# Patient Record
Sex: MALE | Race: WHITE | Employment: FULL TIME | URBAN - METROPOLITAN AREA
[De-identification: names, ages, dates, MRNs, and addresses within clinical notes are randomized per-mention and may not be internally consistent; named-entity substitution may affect disease eponyms.]

---

## 2017-08-09 ENCOUNTER — APPOINTMENT (OUTPATIENT)
Dept: GENERAL RADIOLOGY | Age: 27
End: 2017-08-09
Attending: PHYSICIAN ASSISTANT
Payer: COMMERCIAL

## 2017-08-09 ENCOUNTER — HOSPITAL ENCOUNTER (EMERGENCY)
Age: 27
Discharge: HOME | End: 2017-08-10
Attending: EMERGENCY MEDICINE
Payer: COMMERCIAL

## 2017-08-09 DIAGNOSIS — S62.326A CLOSED DISPLACED FRACTURE OF SHAFT OF FIFTH METACARPAL BONE OF RIGHT HAND, INITIAL ENCOUNTER: Primary | ICD-10-CM

## 2017-08-09 PROCEDURE — 29125 APPL SHORT ARM SPLINT STATIC: CPT

## 2017-08-09 PROCEDURE — 74011250637 HC RX REV CODE- 250/637: Performed by: PHYSICIAN ASSISTANT

## 2017-08-09 PROCEDURE — 75810000053 HC SPLINT APPLICATION

## 2017-08-09 PROCEDURE — 73130 X-RAY EXAM OF HAND: CPT

## 2017-08-09 PROCEDURE — 99284 EMERGENCY DEPT VISIT MOD MDM: CPT

## 2017-08-09 RX ORDER — ACETAMINOPHEN 325 MG/1
650 TABLET ORAL
Status: COMPLETED | OUTPATIENT
Start: 2017-08-09 | End: 2017-08-10

## 2017-08-09 RX ORDER — NAPROXEN 250 MG/1
500 TABLET ORAL
Status: COMPLETED | OUTPATIENT
Start: 2017-08-09 | End: 2017-08-09

## 2017-08-09 RX ADMIN — NAPROXEN 500 MG: 250 TABLET ORAL at 22:50

## 2017-08-09 NOTE — LETTER
[address] 
[facility] EMERGENCY DEPT 
[address] [place] [address] [address] [id number] [phone] Work/School Note Date: 8/9/2017 To Whom It May concern: 
 
Antonio Ruiz was seen and treated today in the emergency room by the following provider(s): 
Attending Provider: Ran Gifford MD.   
 
Antonio Ruiz may return to work on 8/13/17, unable to use right hand until cleared by orthopedic surgery. Sincerely, Ran Serna.  Mary Gifford MD

## 2017-08-10 VITALS
HEIGHT: 71 IN | TEMPERATURE: 98.2 F | WEIGHT: 181.22 LBS | DIASTOLIC BLOOD PRESSURE: 94 MMHG | RESPIRATION RATE: 18 BRPM | SYSTOLIC BLOOD PRESSURE: 135 MMHG | OXYGEN SATURATION: 98 % | BODY MASS INDEX: 25.37 KG/M2 | HEART RATE: 101 BPM

## 2017-08-10 PROCEDURE — 74011250637 HC RX REV CODE- 250/637: Performed by: EMERGENCY MEDICINE

## 2017-08-10 RX ORDER — ACETAMINOPHEN 500 MG
1000 TABLET ORAL
Qty: 40 TAB | Refills: 0 | Status: SHIPPED | OUTPATIENT
Start: 2017-08-10 | End: 2017-11-01

## 2017-08-10 RX ORDER — MORPHINE SULFATE 15 MG/1
15-30 TABLET ORAL
Qty: 20 TAB | Refills: 0 | Status: SHIPPED | OUTPATIENT
Start: 2017-08-10 | End: 2017-11-01

## 2017-08-10 RX ADMIN — ACETAMINOPHEN 650 MG: 325 TABLET, FILM COATED ORAL at 00:01

## 2017-08-10 NOTE — ED PROVIDER NOTES
HPI Comments: Keerthi Davis is a 32 y.o. male presents ambulatory to the ED c/o an acute onset of right hand pain with associated swelling after punching a piece of thick glass last night, which did not break when he hit it. He expresses that upon waking up this morning the swelling was exacerbated and has been worsening throughout the day leading him to the ED. The pt notes that his pain is exacerbated with movement of the lateral fingers. He discloses that he is right hand dominant. The pt denies taking any medication PTA. He ensures that his Tetanus booster is UTD. He specifically denies any other complaints at this time. PCP: Lydia Isbell MD      There are no other complaints, changes or physical findings at this time. Written by BEBETO Hunteribdharmesh, as dictated by Nikki Cosby. Edwar Chaidez MD     The history is provided by the patient. No  was used. No past medical history on file. No past surgical history on file. No family history on file. Social History     Social History    Marital status: SINGLE     Spouse name: N/A    Number of children: N/A    Years of education: N/A     Occupational History    Not on file. Social History Main Topics    Smoking status: Not on file    Smokeless tobacco: Not on file    Alcohol use Not on file    Drug use: Not on file    Sexual activity: Not on file     Other Topics Concern    Not on file     Social History Narrative         ALLERGIES: Review of patient's allergies indicates no known allergies. Review of Systems   Constitutional: Negative for chills and fever. HENT: Negative for congestion. Eyes: Negative for visual disturbance. Respiratory: Negative for chest tightness. Cardiovascular: Negative for chest pain and leg swelling. Gastrointestinal: Negative for abdominal pain and vomiting. Endocrine: Negative for polyuria. Genitourinary: Negative for dysuria and frequency.    Musculoskeletal: Positive for arthralgias (right hand) and joint swelling (right hand). Negative for myalgias. Skin: Negative for color change. Allergic/Immunologic: Negative for immunocompromised state. Neurological: Negative for numbness. Patient Vitals for the past 12 hrs:   Temp Pulse Resp BP SpO2   08/10/17 0001 - (!) 101 18 (!) 135/94 98 %   08/09/17 2241 98.2 °F (36.8 °C) (!) 120 16 (!) 140/91 97 %        Physical Exam   Nursing note and vitals reviewed. General appearance: non-toxic, NAD  Eyes: PERRL, conjunctiva normal, anicteric sclera  HEENT:  oropharynx is clear  Pulmonary: good air exchange; scattered wheeze noted; no respiratory distress  Cardiac: normal rate and regular rhythm, no murmurs, gallops, or rubs, 2+ radial pulses  Abdomen: soft, nontender, nondistended, bowel sounds present  MSK: no pre-tibial edema, difficulty with but completes 5th digit opposition, dorsal soft tissue swelling of the right hand, TTP dorsal R hand over 4th/5th metacarpals  Neuro: Alert, answers questions appropriately  Skin: capillary refill brisk; healing granulation tissue over R 3rd metacarpal. No signs of laceration, abrasion only. MDM  Number of Diagnoses or Management Options  Diagnosis management comments: DDx: Strain, Sprain, Fracture. Amount and/or Complexity of Data Reviewed  Tests in the radiology section of CPT®: ordered and reviewed  Review and summarize past medical records: yes  Discuss the patient with other providers: yes (Orthopedics)    Patient Progress  Patient progress: stable    ED Course       Procedures    CONSULT NOTE:  12:20 AM  Nikki Cosby. Edwar Chaidez MD spoke with Ramon Maravilla PA-C,  Specialty: Orthopedics  Discussed pt's hx, disposition, and available diagnostic and imaging results. Reviewed care plans. Consultant recommends placing the pt in an ulnar gutter splint and having the pt follow up in office. Written by Alex Edmond ED Scribe, as dictated by Nikki Cosby.  Edwar Chaidez MD     Procedure Note - Splint Placement:  12:54 AM  Performed by: Anjelica Rdz   Neurovascularly intact prior to tx. An Orthoglass ulnar gutter splint was placed on pt's right hand. Joint was placed in extension with MCP's at 90 degrees. Neurovascularly intact after tx. The procedure took 1-15 minutes, and pt tolerated well. Written by Trevor Ahn ED Scribe, as dictated by [employer]. Mary Gifford MD.      IMAGING RESULTS:  XR HAND RT MIN 3 V   Final Result   EXAM:  XR HAND RT MIN 3 V     INDICATION:  injury. Right hand injury     COMPARISON: None.     FINDINGS: Three views of the right hand demonstrate an acute transverse fracture  of the distal shaft of the fifth metacarpal with apex posterior angulation. There is slight displacement of less than one cortical width. There is  overlying soft tissue swelling. There are no radiopaque foreign bodies  appreciated.     IMPRESSION  IMPRESSION:  Acute angulated fracture of the distal shaft of the fifth  metacarpal.       MEDICATIONS GIVEN:  Medications   naproxen (NAPROSYN) tablet 500 mg (500 mg Oral Given 8/9/17 2250)   acetaminophen (TYLENOL) tablet 650 mg (650 mg Oral Given 8/10/17 0001)       IMPRESSION:  1. Closed displaced fracture of shaft of fifth metacarpal bone of right hand, initial encounter        PLAN:  1. Current Discharge Medication List      START taking these medications    Details   acetaminophen (TYLENOL EXTRA STRENGTH) 500 mg tablet Take 2 Tabs by mouth every six (6) hours as needed for Pain. Indications: Pain  Qty: 40 Tab, Refills: 0      morphine IR (MS IR) 15 mg tablet Take 1-2 Tabs by mouth every six (6) hours as needed for Pain (Breakthrough pain not relieved by Tylenol or Naproxen). Max Daily Amount: 120 mg.  Indications: Severe Pain, causes drowsiness; do not drink,drive, or use machinery while taking  Qty: 20 Tab, Refills: 0         CONTINUE these medications which have NOT CHANGED    Details   lisdexamfetamine (VYVANSE) 20 mg capsule Take 20 mg by mouth two (2) times a day. 2.   Follow-up Information     Follow up With Details Comments [address] May MD Schedule an appointment as soon as possible for a visit in 1 week As needed [address] [place] [address] [facility]  [phone]      [facility] EMERGENCY DEPT Go in 1 day If symptoms worsen [address] [facility] [address], DO Schedule an appointment as soon as possible for a visit in 1 day [place] [id number] [phone]          3. Return to ED if worse   Discharge Note:  12:57 AM  The patient is ready for discharge. The patient's signs, symptoms, diagnosis, and discharge instruction have been discussed and the patient has conveyed their understanding. The patient is to follow up as recommended or return to the ER should their symptoms worsen. Plan has been discussed and the patient is in agreement. Written by Sterling Edmond ED Scribe, as dictated by J Carlos Jimenez. Nia Moreira MD     Attestation: This note is prepared by Yvonne Granda. Mayito, acting as Scribe for J Carlos Jimenez. Nia Moreira MD.      J Carlos Jimenez. Nia Moreira MD: The scribe's documentation has been prepared under my direction and personally reviewed by me in its entirety. I confirm that the note above accurately reflects all work, treatment, procedures, and medical decision making performed by me.

## 2017-08-10 NOTE — ED NOTES
Pt reports \"hit a piece of glass yesterday due to circumstances. I've used it a lot today it feels tight. \" Reports tetanus shot in last 5 years \"12/2016. \" Palpable pulses. <3sec cap refill. Redness & swelling noted to right hand, mostly on right last 3 knuckles. Able to perform some ROM but has a lot of pain which radiated to right elbow. Pt reported \"punch a piece of glass, did not shatter. \" Works as .

## 2017-08-10 NOTE — ED NOTES
Splinted by BAR Hampton. .Discharge instructions reviewed with patient and given to pt per MD Gonzalo Polk. Pt able to return/verbalize discharge instructions. Copy of discharge instructions given. RX given to pt. Pt condition stable, no further complaints. Pt out of ER, accompanied by self. Ambulatory, steady gait. Wheelchair offered & pt declined. Patient out of ED prior to obtaining discharge vitals. Belongings & discharge paperwork out of ED with patient.

## 2017-08-10 NOTE — DISCHARGE INSTRUCTIONS
Broken Hand: Care Instructions  Your Care Instructions  A hand can break (fracture) during sports, a fall, or other accidents. The break may happen when your hand twists, is hit, or is used to protect you in a fall. Fractures can range from a small, hairline crack, to a bone or bones broken into two or more pieces. Your treatment depends on how bad the break is. Your doctor may have put your hand in a brace, splint, or cast to allow it to heal or to keep it stable until you see another doctor. It may take weeks or months for your hand to heal. You can help it heal with some care at home. You heal best when you take good care of yourself. Eat a variety of healthy foods, and don't smoke. Follow-up care is a key part of your treatment and safety. Be sure to make and go to all appointments, and call your doctor if you are having problems. It's also a good idea to know your test results and keep a list of the medicines you take. How can you care for yourself at home? · Put ice or a cold pack on your hand for 10 to 20 minutes at a time. Try to do this every 1 to 2 hours for the next 3 days (when you are awake). Put a thin cloth between the ice and your cast or splint. Keep your cast or splint dry. · Follow the cast care instructions your doctor gives you. If you have a splint, do not take it off unless your doctor tells you to. · Be safe with medicines. Take pain medicines exactly as directed. ¨ If the doctor gave you a prescription medicine for pain, take it as prescribed. ¨ If you are not taking a prescription pain medicine, ask your doctor if you can take an over-the-counter medicine. · Prop up your hand on pillows when you sit or lie down in the first few days after the injury. Keep your hand higher than the level of your heart. This will help reduce swelling. · Follow instructions for exercises to keep your arm strong.   · Wiggle your uninjured fingers often to reduce swelling and stiffness, but do not use that hand to grasp or carry anything. When should you call for help? Call your doctor now or seek immediate medical care if:  · You have severe pain or increased pain. · Your cast or splint feels too tight. · You cannot move your fingers. · You have tingling, weakness, or numbness in your hand and fingers. · Your hand or fingers are cool or pale or change color. · You have a lot of swelling near your cast or splint. · The skin under your cast or splint is burning or stinging. Watch closely for changes in your health, and be sure to contact your doctor if:  · You do not get better as expected. Where can you learn more? Go to http://raffi-vivi.info/. Enter [phone] [phone] in the search box to learn more about \"Broken Hand: Care Instructions. \"  Current as of: March 21, 2017  Content Version: 11.3  © 4268-5109 [employer]. Care instructions adapted under license by [employer] (which disclaims liability or warranty for this information). If you have questions about a medical condition or this instruction, always ask your healthcare professional. [place] [address] any warranty or liability for your use of this information. Caring for Your Splint: After Your Visit  Your Care Instructions     A splint protects a broken bone or other injury. If you have a removable splint, follow your doctor's instructions and only remove the splint if your doctor says you can. Most splints can be adjusted. Your doctor will show you how to do this and will tell you when you might need to adjust the splint. Many splints are premade. Your doctor may also make a splint from plaster or fiberglass. Some splints have a built-in air cushion. Air pads are inflated to hold the injured area in place. Follow-up care is a key part of your treatment and safety. Be sure to make and go to all appointments, and call your doctor if you are having problems.  It's also a good idea to know your test results and keep a list of the medicines you take. How can you care for yourself at home? General care  · Don't put any weight on a splint. If you have a walking boot, your doctor will tell you when you can put weight on it. · If the fingers or toes on the limb with the splint were not injured, wiggle them every now and then. This helps move the blood and fluids in the injured limb. · Prop up the injured arm or leg on a pillow when you ice it or anytime you sit or lie down during the next 3 days. Try to keep it above the level of your heart. This will help reduce swelling. · Put ice or cold packs on the hurt area for 10 to 20 minutes at a time. Try to do this every 1 to 2 hours for the next 3 days (when you are awake) or until the swelling goes down. Be careful not to get the splint wet. · Be safe with medicines. Read and follow all instructions on the label. ¨ If the doctor gave you a prescription medicine for pain, take it as prescribed. ¨ If you are not taking a prescription pain medicine, ask your doctor if you can take an over-the-counter medicine. · Keep up your muscle strength and tone as much as you can while protecting your injured limb or joint. Your doctor may want you to tense and relax the muscles protected by the splint. Check with your doctor or physical therapist for instructions. Splint and skin care  · If your splint is not to be removed, try blowing cool air from a hair dryer or fan into the splint to help relieve itching. Never stick items under your splint to scratch the skin. · Do not use oils or lotions near your splint. If the skin becomes red or sore around the edge of the splint, you may pad the edges with a soft material, such as moleskin, or use tape to cover the edges. · If you're allowed to take your splint off, be sure your skin is dry before you put it back on. · Watch for pressure sores. These can develop over bony areas.  Symptoms include a warm spot under the cast, pain, drainage, or an odor. Call your doctor if you think you have a pressure sore. · Watch for compartment syndrome. This happens when pressure builds up in a group of muscles, nerves, and blood vessels. It is an emergency. Symptoms include severe pain or tingling or numbness. Water and your splint  · Keep your splint dry. Moisture can collect under the splint and cause skin irritation and itching. If you have a wound or have had surgery, moisture under the splint can increase the risk of infection. · Cover your splint with at least two layers of plastic when you take a shower or bath, unless your doctor said you can take it off while bathing. · If you can take the splint off when you bathe, pat the area dry after bathing and put the splint back on.  · If your splint gets a little wet, you can dry it with a hair dryer. Use a \"cool\" setting. When should you call for help? Call your doctor now or seek immediate medical care if:  · You have increased or severe pain. · You feel a warm or painful spot under the splint. · You have problems with your splint. For example:  ¨ The skin under the splint burns or stings. ¨ The splint feels too tight. ¨ There is a lot of swelling near the splint. (Some swelling is normal.)  ¨ You have a new fever. ¨ There is drainage or a bad smell coming from the splint. · Your foot or hand is cool or pale or changes color. · You have trouble moving your fingers or toes. · You have symptoms of a blood clot in your arm or leg (called a deep vein thrombosis). These may include:  ¨ Pain in the arm, calf, back of the knee, thigh, or groin. ¨ Redness and swelling in the arm, leg, or groin. Watch closely for changes in your health, and be sure to contact your doctor if:  · The splint is breaking apart or losing its shape. · You are not getting better as expected. Where can you learn more?    Go to [employer].be  Enter E456 in the search box to learn more about \"Caring for Your Splint: After Your Visit. \"   © 6048-6983 [employer]. Care instructions adapted under license by [facility] (which disclaims liability or warranty for this information). This care instruction is for use with your licensed healthcare professional. If you have questions about a medical condition or this instruction, always ask your healthcare professional. [place] [address] any warranty or liability for your use of this information.   Content Version: 33.3.283248; Current as of: June 4, 2014

## 2017-09-04 ENCOUNTER — HOSPITAL ENCOUNTER (EMERGENCY)
Age: 27
Discharge: HOME | End: 2017-09-04
Attending: EMERGENCY MEDICINE
Payer: COMMERCIAL

## 2017-09-04 ENCOUNTER — APPOINTMENT (OUTPATIENT)
Dept: CT IMAGING | Age: 27
End: 2017-09-04
Attending: EMERGENCY MEDICINE
Payer: COMMERCIAL

## 2017-09-04 VITALS
HEART RATE: 98 BPM | RESPIRATION RATE: 18 BRPM | SYSTOLIC BLOOD PRESSURE: 152 MMHG | BODY MASS INDEX: 25.2 KG/M2 | HEIGHT: 71 IN | WEIGHT: 180 LBS | OXYGEN SATURATION: 99 % | DIASTOLIC BLOOD PRESSURE: 96 MMHG

## 2017-09-04 DIAGNOSIS — F10.929 ALCOHOL INTOXICATION, WITH UNSPECIFIED COMPLICATION (HCC): ICD-10-CM

## 2017-09-04 DIAGNOSIS — T07.XXXA ABRASIONS OF MULTIPLE SITES: Primary | ICD-10-CM

## 2017-09-04 LAB
ALBUMIN SERPL-MCNC: 3.7 G/DL (ref 3.5–5)
ALBUMIN/GLOB SERPL: 1.2 {RATIO} (ref 1.1–2.2)
ALP SERPL-CCNC: 64 U/L (ref 45–117)
ALT SERPL-CCNC: 23 U/L (ref 12–78)
AMPHET UR QL SCN: POSITIVE
ANION GAP SERPL CALC-SCNC: 10 MMOL/L (ref 5–15)
APPEARANCE UR: CLEAR
AST SERPL-CCNC: 18 U/L (ref 15–37)
BACTERIA URNS QL MICRO: NEGATIVE /HPF
BARBITURATES UR QL SCN: NEGATIVE
BASOPHILS # BLD: 0.1 K/UL (ref 0–0.1)
BASOPHILS NFR BLD: 1 % (ref 0–1)
BENZODIAZ UR QL: NEGATIVE
BILIRUB SERPL-MCNC: 0.2 MG/DL (ref 0.2–1)
BILIRUB UR QL: NEGATIVE
BUN SERPL-MCNC: 8 MG/DL (ref 6–20)
BUN/CREAT SERPL: 8 (ref 12–20)
CALCIUM SERPL-MCNC: 7.7 MG/DL (ref 8.5–10.1)
CANNABINOIDS UR QL SCN: NEGATIVE
CHLORIDE SERPL-SCNC: 107 MMOL/L (ref 97–108)
CO2 SERPL-SCNC: 23 MMOL/L (ref 21–32)
COCAINE UR QL SCN: NEGATIVE
COLOR UR: ABNORMAL
CREAT BLD-MCNC: 1.2 MG/DL (ref 0.6–1.3)
CREAT SERPL-MCNC: 1.03 MG/DL (ref 0.7–1.3)
DRUG SCRN COMMENT,DRGCM: ABNORMAL
EOSINOPHIL # BLD: 0.8 K/UL (ref 0–0.4)
EOSINOPHIL NFR BLD: 7 % (ref 0–7)
EPITH CASTS URNS QL MICRO: ABNORMAL /LPF
ERYTHROCYTE [DISTWIDTH] IN BLOOD BY AUTOMATED COUNT: 13.5 % (ref 11.5–14.5)
ETHANOL SERPL-MCNC: 151 MG/DL
GLOBULIN SER CALC-MCNC: 3.2 G/DL (ref 2–4)
GLUCOSE SERPL-MCNC: 114 MG/DL (ref 65–100)
GLUCOSE UR STRIP.AUTO-MCNC: NEGATIVE MG/DL
HCT VFR BLD AUTO: 43 % (ref 36.6–50.3)
HGB BLD-MCNC: 15.3 G/DL (ref 12.1–17)
HGB UR QL STRIP: NEGATIVE
HYALINE CASTS URNS QL MICRO: ABNORMAL /LPF (ref 0–5)
KETONES UR QL STRIP.AUTO: NEGATIVE MG/DL
LACTATE SERPL-SCNC: 3.2 MMOL/L (ref 0.4–2)
LEUKOCYTE ESTERASE UR QL STRIP.AUTO: NEGATIVE
LYMPHOCYTES # BLD: 2.5 K/UL (ref 0.8–3.5)
LYMPHOCYTES NFR BLD: 22 % (ref 12–49)
MCH RBC QN AUTO: 30.5 PG (ref 26–34)
MCHC RBC AUTO-ENTMCNC: 35.6 G/DL (ref 30–36.5)
MCV RBC AUTO: 85.7 FL (ref 80–99)
METHADONE UR QL: NEGATIVE
MONOCYTES # BLD: 0.8 K/UL (ref 0–1)
MONOCYTES NFR BLD: 7 % (ref 5–13)
MUCOUS THREADS URNS QL MICRO: ABNORMAL /LPF
NEUTS SEG # BLD: 7.3 K/UL (ref 1.8–8)
NEUTS SEG NFR BLD: 63 % (ref 32–75)
NITRITE UR QL STRIP.AUTO: NEGATIVE
OPIATES UR QL: POSITIVE
PCP UR QL: NEGATIVE
PH UR STRIP: 6 [PH] (ref 5–8)
PLATELET # BLD AUTO: 264 K/UL (ref 150–400)
POTASSIUM SERPL-SCNC: 3.5 MMOL/L (ref 3.5–5.1)
PROT SERPL-MCNC: 6.9 G/DL (ref 6.4–8.2)
PROT UR STRIP-MCNC: 30 MG/DL
RBC # BLD AUTO: 5.02 M/UL (ref 4.1–5.7)
RBC #/AREA URNS HPF: ABNORMAL /HPF (ref 0–5)
SODIUM SERPL-SCNC: 140 MMOL/L (ref 136–145)
SP GR UR REFRACTOMETRY: 1.02 (ref 1–1.03)
SPERM URNS QL MICRO: PRESENT
TROPONIN I SERPL-MCNC: <0.04 NG/ML
UROBILINOGEN UR QL STRIP.AUTO: 0.2 EU/DL (ref 0.2–1)
WBC # BLD AUTO: 11.4 K/UL (ref 4.1–11.1)
WBC URNS QL MICRO: ABNORMAL /HPF (ref 0–4)

## 2017-09-04 PROCEDURE — 74011250636 HC RX REV CODE- 250/636: Performed by: EMERGENCY MEDICINE

## 2017-09-04 PROCEDURE — 96360 HYDRATION IV INFUSION INIT: CPT

## 2017-09-04 PROCEDURE — 80307 DRUG TEST PRSMV CHEM ANLYZR: CPT | Performed by: EMERGENCY MEDICINE

## 2017-09-04 PROCEDURE — 36415 COLL VENOUS BLD VENIPUNCTURE: CPT | Performed by: EMERGENCY MEDICINE

## 2017-09-04 PROCEDURE — 83605 ASSAY OF LACTIC ACID: CPT | Performed by: EMERGENCY MEDICINE

## 2017-09-04 PROCEDURE — 82565 ASSAY OF CREATININE: CPT

## 2017-09-04 PROCEDURE — 80053 COMPREHEN METABOLIC PANEL: CPT | Performed by: EMERGENCY MEDICINE

## 2017-09-04 PROCEDURE — 84484 ASSAY OF TROPONIN QUANT: CPT | Performed by: EMERGENCY MEDICINE

## 2017-09-04 PROCEDURE — 99285 EMERGENCY DEPT VISIT HI MDM: CPT

## 2017-09-04 PROCEDURE — 85025 COMPLETE CBC W/AUTO DIFF WBC: CPT | Performed by: EMERGENCY MEDICINE

## 2017-09-04 PROCEDURE — 70450 CT HEAD/BRAIN W/O DYE: CPT

## 2017-09-04 PROCEDURE — 70498 CT ANGIOGRAPHY NECK: CPT

## 2017-09-04 PROCEDURE — 81001 URINALYSIS AUTO W/SCOPE: CPT | Performed by: EMERGENCY MEDICINE

## 2017-09-04 PROCEDURE — 96361 HYDRATE IV INFUSION ADD-ON: CPT

## 2017-09-04 PROCEDURE — 74011636320 HC RX REV CODE- 636/320: Performed by: EMERGENCY MEDICINE

## 2017-09-04 RX ORDER — SODIUM CHLORIDE 0.9 % (FLUSH) 0.9 %
10 SYRINGE (ML) INJECTION
Status: COMPLETED | OUTPATIENT
Start: 2017-09-04 | End: 2017-09-04

## 2017-09-04 RX ORDER — SODIUM CHLORIDE 9 MG/ML
50 INJECTION, SOLUTION INTRAVENOUS
Status: COMPLETED | OUTPATIENT
Start: 2017-09-04 | End: 2017-09-04

## 2017-09-04 RX ADMIN — Medication 10 ML: at 07:38

## 2017-09-04 RX ADMIN — SODIUM CHLORIDE 1000 ML: 900 INJECTION, SOLUTION INTRAVENOUS at 07:20

## 2017-09-04 RX ADMIN — SODIUM CHLORIDE 50 ML/HR: 900 INJECTION, SOLUTION INTRAVENOUS at 07:38

## 2017-09-04 RX ADMIN — IOPAMIDOL 100 ML: 755 INJECTION, SOLUTION INTRAVENOUS at 07:38
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
## 2017-09-04 NOTE — DISCHARGE INSTRUCTIONS

## 2017-09-04 NOTE — ED NOTES
Bedside and Verbal shift change report given to Negra WOODS RN (oncoming nurse) by Mildred Ley RN (offgoing nurse). Report included the following information SBAR, Kardex, ED Summary and MAR.

## 2017-09-04 NOTE — ED NOTES
Bedside and Verbal shift change report given to Lydia Donovan RN and Farhana Rm RN (oncoming nurse). Report included the following information: SBAR, ED Summary, MAR and Recent Results.
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
## 2017-09-04 NOTE — ED NOTES
Dr. Wendy Lin reviewed discharge instructions with patient. Patient had no questions for physician. Patient was alert and oriented x3 and was in no physical distress and respirations were unlabored. Patient was discharged and escorted out of ER by [facility].

## 2017-11-01 ENCOUNTER — APPOINTMENT (OUTPATIENT)
Dept: GENERAL RADIOLOGY | Age: 27
End: 2017-11-01
Attending: NURSE PRACTITIONER

## 2017-11-01 ENCOUNTER — HOSPITAL ENCOUNTER (EMERGENCY)
Age: 27
Discharge: HOME | End: 2017-11-01
Attending: FAMILY MEDICINE

## 2017-11-01 VITALS
SYSTOLIC BLOOD PRESSURE: 140 MMHG | DIASTOLIC BLOOD PRESSURE: 99 MMHG | WEIGHT: 183 LBS | HEART RATE: 100 BPM | BODY MASS INDEX: 25.62 KG/M2 | RESPIRATION RATE: 18 BRPM | TEMPERATURE: 97.5 F | HEIGHT: 71 IN | OXYGEN SATURATION: 97 %

## 2017-11-01 DIAGNOSIS — R05.3 CHRONIC COUGH: ICD-10-CM

## 2017-11-01 DIAGNOSIS — F17.210 CIGARETTE SMOKER: Primary | ICD-10-CM

## 2017-11-01 RX ORDER — DOXYCYCLINE HYCLATE 100 MG
100 TABLET ORAL 2 TIMES DAILY
Qty: 14 TAB | Refills: 0 | Status: SHIPPED | OUTPATIENT
Start: 2017-11-01 | End: 2017-11-08

## 2017-11-01 RX ORDER — PREDNISONE 20 MG/1
TABLET ORAL
Qty: 10 TAB | Refills: 0 | Status: SHIPPED | OUTPATIENT
Start: 2017-11-01

## 2017-11-01 NOTE — DISCHARGE INSTRUCTIONS

## 2017-11-01 NOTE — UC PROVIDER NOTE
HPI Comments:     Here for 2 months of cough. Onset at first with cold like symptoms. Cough lasted for multiple weeks then went away. It seemed to return approx 3 weeks ago and has progressively gotten worse. He did have fever approx 1 week ago but thinks it is now gone. Cough worse in mornings and evenings and when he goes out in cold air. There is associated chest tightness and pain with deep breathing. Approx 1 week ago he started an old Rx of bactrim he had laying around the house this has not helped his cough. Denies asthma or underlying lung disease. Is a daily smoker trying to quit. Patient is a 32 y.o. male presenting with cough. Cough   Associated symptoms include wheezing. Pertinent negatives include no headaches, no shortness of breath, no nausea and no vomiting. Past Medical History:   Diagnosis Date    ADD (attention deficit disorder)         Past Surgical History:   Procedure Laterality Date    HX WISDOM TEETH EXTRACTION           Family History   Problem Relation Age of Onset    No Known Problems Mother     No Known Problems Father         Social History     Social History    Marital status: SINGLE     Spouse name: N/A    Number of children: N/A    Years of education: N/A     Occupational History    Not on file. Social History Main Topics    Smoking status: Current Every Day Smoker     Packs/day: 1.00    Smokeless tobacco: Never Used    Alcohol use Yes      Comment: Denies~quit \"last march, I don't drink but on occasion. , still an adult. \"     Drug use: No    Sexual activity: Yes     Birth control/ protection: None     Other Topics Concern    Not on file     Social History Narrative                ALLERGIES: Review of patient's allergies indicates no known allergies. Review of Systems   Constitutional: Positive for fever. Negative for unexpected weight change. Respiratory: Positive for cough and wheezing. Negative for shortness of breath.     Gastrointestinal: Negative for nausea and vomiting. Skin: Negative for rash. Neurological: Negative for headaches. Vitals:    11/01/17 1749   BP: (!) 140/99   Pulse: 100   Resp: 18   Temp: 97.5 °F (36.4 °C)   SpO2: 97%   Weight: 83 kg (183 lb)   Height: 5' 11\" (1.803 m)       Physical Exam   Constitutional: He is oriented to person, place, and time. Seems anxious. Fidgety. Moving around constantly on exam table. Does not seem toxic or ill. Not distressed. HENT:   Head: Normocephalic. Right Ear: External ear normal.   Left Ear: External ear normal.   Nose: Nose normal.   Mouth/Throat: Oropharynx is clear and moist. No oropharyngeal exudate. Eyes: EOM are normal. Pupils are equal, round, and reactive to light. Neck: Normal range of motion. Neck supple. No thyromegaly present. Cardiovascular: Normal rate, regular rhythm and normal heart sounds. Exam reveals no gallop and no friction rub. No murmur heard. Pulmonary/Chest: Effort normal and breath sounds normal. No respiratory distress. He has no wheezes. He has no rales. Lymphadenopathy:     He has no cervical adenopathy. Neurological: He is alert and oriented to person, place, and time. No cranial nerve deficit. Skin: Skin is warm and dry. Psychiatric: He has a normal mood and affect. Thought content normal.       MDM     Differential Diagnosis; Clinical Impression; Plan:       CLINICAL IMPRESSION:  (K20.500) Cigarette smoker  (primary encounter diagnosis)  (R05) Chronic cough    Orders Placed This Encounter      XR CHEST PA LAT      doxycycline (VIBRA-TABS) 100 mg tablet      predniSONE (DELTASONE) 20 mg tablet    CXR WNL. Will cover with doxy for possible chronic PND/bacterial process. Needs to follow up with PCP if symptoms do not improve over next 4-5 days as this required further work up. Plan:  1. Stop smoking. 2. Take above meds as Rx'd  3. See your PCP in 1 week.      Amount and/or Complexity of Data Reviewed:   Tests in the radiology section of CPT®:  Ordered and reviewed  Risk of Significant Complications, Morbidity, and/or Mortality:   Presenting problems: Moderate  Diagnostic procedures: Moderate  Management options:   Moderate  Progress:   Patient progress:  Stable      Procedures

## 2018-12-10 ENCOUNTER — APPOINTMENT (OUTPATIENT)
Dept: GENERAL RADIOLOGY | Age: 28
End: 2018-12-10
Payer: SELF-PAY

## 2018-12-10 ENCOUNTER — HOSPITAL ENCOUNTER (EMERGENCY)
Age: 28
Discharge: HOME | End: 2018-12-10
Attending: EMERGENCY MEDICINE
Payer: SELF-PAY

## 2018-12-10 VITALS
TEMPERATURE: 98.2 F | OXYGEN SATURATION: 100 % | RESPIRATION RATE: 18 BRPM | HEART RATE: 107 BPM | HEIGHT: 71 IN | BODY MASS INDEX: 26.45 KG/M2 | WEIGHT: 188.93 LBS | DIASTOLIC BLOOD PRESSURE: 77 MMHG | SYSTOLIC BLOOD PRESSURE: 121 MMHG

## 2018-12-10 DIAGNOSIS — J01.10 ACUTE FRONTAL SINUSITIS, RECURRENCE NOT SPECIFIED: Primary | ICD-10-CM

## 2018-12-10 PROCEDURE — 71046 X-RAY EXAM CHEST 2 VIEWS: CPT

## 2018-12-10 PROCEDURE — 99281 EMR DPT VST MAYX REQ PHY/QHP: CPT

## 2018-12-10 RX ORDER — FLUTICASONE PROPIONATE 50 MCG
2 SPRAY, SUSPENSION (ML) NASAL DAILY
Qty: 1 BOTTLE | Refills: 0 | Status: SHIPPED | OUTPATIENT
Start: 2018-12-10

## 2018-12-10 RX ORDER — HYDROCODONE POLISTIREX AND CHLORPHENIRAMINE POLISTIREX 10; 8 MG/5ML; MG/5ML
5 SUSPENSION, EXTENDED RELEASE ORAL
Qty: 100 ML | Refills: 0 | Status: SHIPPED | OUTPATIENT
Start: 2018-12-10

## 2018-12-10 RX ORDER — AMOXICILLIN AND CLAVULANATE POTASSIUM 875; 125 MG/1; MG/1
1 TABLET, FILM COATED ORAL 2 TIMES DAILY
Qty: 20 TAB | Refills: 0 | Status: SHIPPED | OUTPATIENT
Start: 2018-12-10

## 2018-12-10 NOTE — LETTER
[address] 
[facility] EMERGENCY DEPT 
[address] [place] [address] [phone] 
[phone] Work/School Note Date: 12/10/2018 To Whom It May concern: 
 
Jacqueline Ly was seen and treated today in the emergency room by the following provider(s): 
Attending Provider: Karla Mchugh MD 
Physician Assistant: BAR Portillo. Jacqueline Ly may return to work on [id number]. Sincerely, BAR Araujo

## 2018-12-11 NOTE — ED NOTES
Pt given discharge instructions by BAR Spears. Discharged ambulatory with steady gait. No acute distress at time of discharge.

## 2018-12-11 NOTE — ED PROVIDER NOTES
EMERGENCY DEPARTMENT HISTORY AND PHYSICAL EXAM 
 
 
Date: 12/10/2018 Patient Name: Adrienne Temple History of Presenting Illness Chief Complaint Patient presents with  Sore Throat  
  started 4-5 days ago with sore throat with cough and drainage making his throat hurt worse  Cough History Provided By: Patient HPI: Adrienne Temple, 29 y.o. male presents ambulatory to the ED with cc of 5 days of 7 out of 10 constant aching sinus pressure that is not better with hot tea and OTC cold medicine and is associated with post nasal drip that causes cough and sore throat. He tells me he has had a sinus infection in the past for which these symptoms are the same and for which improved with a course of antibiotics. He has felt feverish. No known sick contacts. Chief Complaint: sinus pressure Duration: 5 Days Timing:  Constant Location: sinuses Quality: Aching Severity: 7 out of 10 Modifying Factors: no better with OTC cold remedies Associated Symptoms: cough, sore throat, fever There are no other complaints, changes, or physical findings at this time. PCP: Ashok Pearce MD 
 
Current Outpatient Medications Medication Sig Dispense Refill  chlorpheniramine-HYDROcodone (TUSSIONEX PENNKINETIC ER) 10-8 mg/5 mL suspension Take 5 mL by mouth every twelve (12) hours as needed for Cough or Congestion. Max Daily Amount: 10 mL. 100 mL 0  
 fluticasone (FLONASE) 50 mcg/actuation nasal spray 2 Sprays by Both Nostrils route daily. 1 Bottle 0  
 amoxicillin-clavulanate (AUGMENTIN) 875-125 mg per tablet Take 1 Tab by mouth two (2) times a day. 20 Tab 0  predniSONE (DELTASONE) 20 mg tablet Take 2 tabs by mouth twice daily for 5 days. With food/Breakfast 10 Tab 0  
 lisdexamfetamine (VYVANSE) 20 mg capsule Take 20 mg by mouth two (2) times a day. Past History Past Medical History: 
Past Medical History:  
Diagnosis Date  ADD (attention deficit disorder) Past Surgical History: 
Past Surgical History:  
Procedure Laterality Date  HX WISDOM TEETH EXTRACTION Family History: 
Family History Problem Relation Age of Onset  No Known Problems Mother  No Known Problems Father Social History: 
Social History Tobacco Use  Smoking status: Current Every Day Smoker Packs/day: 1.00  Smokeless tobacco: Never Used Substance Use Topics  Alcohol use: Yes Comment: Denies~quit \"last march, I don't drink but on occasion. , still an adult. \"   
 Drug use: No  
 
 
Allergies: 
No Known Allergies Review of Systems Review of Systems Constitutional: Positive for fever. Negative for fatigue. HENT: Positive for congestion, sinus pressure, sinus pain and sore throat. Negative for ear pain and rhinorrhea. Eyes: Negative for pain and redness. Respiratory: Positive for cough. Negative for wheezing. Cardiovascular: Negative for chest pain and palpitations. Gastrointestinal: Negative for abdominal pain, nausea and vomiting. Genitourinary: Negative for dysuria, frequency and urgency. Musculoskeletal: Negative for back pain, neck pain and neck stiffness. Skin: Negative for rash and wound. Neurological: Negative for weakness, light-headedness, numbness and headaches. Physical Exam  
Physical Exam  
Constitutional: He is oriented to person, place, and time. He appears well-developed and well-nourished. Non-toxic appearance. No distress. HENT:  
Head: Normocephalic and atraumatic. Head is without right periorbital erythema and without left periorbital erythema. Right Ear: External ear normal.  
Left Ear: External ear normal.  
Nose: Right sinus exhibits frontal sinus tenderness. Left sinus exhibits frontal sinus tenderness. Mouth/Throat: Uvula is midline. No trismus in the jaw. Eyes: Conjunctivae and EOM are normal. Pupils are equal, round, and reactive to light. No scleral icterus. Neck: Normal range of motion and full passive range of motion without pain. Cardiovascular: Normal rate, regular rhythm and normal heart sounds. Pulmonary/Chest: Effort normal and breath sounds normal. No accessory muscle usage. No tachypnea. No respiratory distress. He has no decreased breath sounds. He has no wheezes. Abdominal: Soft. There is no tenderness. There is no rigidity and no guarding. Musculoskeletal: Normal range of motion. Neurological: He is alert and oriented to person, place, and time. He is not disoriented. No cranial nerve deficit or sensory deficit. GCS eye subscore is 4. GCS verbal subscore is 5. GCS motor subscore is 6. Skin: Skin is intact. No rash noted. Psychiatric: He has a normal mood and affect. His speech is normal.  
Nursing note and vitals reviewed. Diagnostic Study Results Labs - No results found for this or any previous visit (from the past 12 hour(s)). Radiologic Studies -  
XR CHEST PA LAT Final Result IMPRESSION: No acute process CT Results  (Last 48 hours) None CXR Results  (Last 48 hours) 12/10/18 1908  XR CHEST PA LAT Final result Impression:  IMPRESSION: No acute process Narrative:  INDICATION:  cough, congestion COMPARISON: 11/1/2017 FINDINGS: PA and lateral views of the chest demonstrate a stable  
cardiomediastinal silhouette and clear lungs bilaterally. The visualized osseous  
structures are unremarkable. Medical Decision Making I am the first provider for this patient. I reviewed the vital signs, available nursing notes, past medical history, past surgical history, family history and social history. Vital Signs-Reviewed the patient's vital signs. Patient Vitals for the past 12 hrs: 
 Temp Pulse Resp BP SpO2  
12/10/18 1728 98.2 °F (36.8 °C) (!) 107 18 121/77 100 % Pulse Oximetry Analysis - 100% on RA 
 
 Records Reviewed: Nursing Notes, Old Medical Records, Previous Radiology Studies, Previous Laboratory Studies and  Provider Notes (Medical Decision Making): DDx: pneumonia, bronchitis, sinusitis, URI 
 
ED Course:  
Initial assessment performed. The patients presenting problems have been discussed, and they are in agreement with the care plan formulated and outlined with them. I have encouraged them to ask questions as they arise throughout their visit. Disposition: 
Discharge PLAN: 
1. Current Discharge Medication List  
  
START taking these medications Details  
chlorpheniramine-HYDROcodone (TUSSIONEX PENNKINETIC ER) 10-8 mg/5 mL suspension Take 5 mL by mouth every twelve (12) hours as needed for Cough or Congestion. Max Daily Amount: 10 mL. Qty: 100 mL, Refills: 0 Associated Diagnoses: Acute frontal sinusitis, recurrence not specified  
  
fluticasone (FLONASE) 50 mcg/actuation nasal spray 2 Sprays by Both Nostrils route daily. Qty: 1 Bottle, Refills: 0  
  
amoxicillin-clavulanate (AUGMENTIN) 875-125 mg per tablet Take 1 Tab by mouth two (2) times a day. Qty: 20 Tab, Refills: 0 CONTINUE these medications which have NOT CHANGED Details  
predniSONE (DELTASONE) 20 mg tablet Take 2 tabs by mouth twice daily for 5 days. With food/Breakfast 
Qty: 10 Tab, Refills: 0  
  
  
 
2. Follow-up Information Follow up With Specialties Details Why Contact Info Derek Shelby MD Family Practice Schedule an appointment as soon as possible for a visit PRIMARY CARE: call to schedule follow up [email or web address] [facility]. [phone] Return to ED if worse Diagnosis Clinical Impression: 1. Acute frontal sinusitis, recurrence not specified

## 2020-01-06 ENCOUNTER — HOSPITAL ENCOUNTER (EMERGENCY)
Age: 30
Discharge: HOME | End: 2020-01-06
Attending: EMERGENCY MEDICINE
Payer: SELF-PAY

## 2020-01-06 ENCOUNTER — APPOINTMENT (OUTPATIENT)
Dept: GENERAL RADIOLOGY | Age: 30
End: 2020-01-06
Attending: NURSE PRACTITIONER
Payer: SELF-PAY

## 2020-01-06 VITALS
RESPIRATION RATE: 16 BRPM | OXYGEN SATURATION: 100 % | TEMPERATURE: 98.7 F | BODY MASS INDEX: 27.56 KG/M2 | WEIGHT: 196.87 LBS | HEART RATE: 101 BPM | HEIGHT: 71 IN | SYSTOLIC BLOOD PRESSURE: 134 MMHG | DIASTOLIC BLOOD PRESSURE: 78 MMHG

## 2020-01-06 DIAGNOSIS — J02.9 PHARYNGITIS, UNSPECIFIED ETIOLOGY: Primary | ICD-10-CM

## 2020-01-06 LAB — DEPRECATED S PYO AG THROAT QL EIA: NEGATIVE

## 2020-01-06 PROCEDURE — 87880 STREP A ASSAY W/OPTIC: CPT

## 2020-01-06 PROCEDURE — 99283 EMERGENCY DEPT VISIT LOW MDM: CPT

## 2020-01-06 PROCEDURE — 93005 ELECTROCARDIOGRAM TRACING: CPT

## 2020-01-06 PROCEDURE — 71046 X-RAY EXAM CHEST 2 VIEWS: CPT

## 2020-01-06 PROCEDURE — 87070 CULTURE OTHR SPECIMN AEROBIC: CPT

## 2020-01-06 PROCEDURE — 74011250636 HC RX REV CODE- 250/636: Performed by: NURSE PRACTITIONER

## 2020-01-06 PROCEDURE — 87147 CULTURE TYPE IMMUNOLOGIC: CPT

## 2020-01-06 RX ORDER — PROMETHAZINE HYDROCHLORIDE 6.25 MG/5ML
12.5 SYRUP ORAL
Qty: 280 ML | Refills: 0 | Status: SHIPPED | OUTPATIENT
Start: 2020-01-06 | End: 2020-01-13

## 2020-01-06 RX ORDER — AMOXICILLIN 875 MG/1
875 TABLET, FILM COATED ORAL 2 TIMES DAILY
Qty: 20 TAB | Refills: 0 | Status: SHIPPED | OUTPATIENT
Start: 2020-01-06 | End: 2020-01-06

## 2020-01-06 RX ORDER — AMOXICILLIN 875 MG/1
875 TABLET, FILM COATED ORAL 2 TIMES DAILY
Qty: 20 TAB | Refills: 0 | Status: SHIPPED | OUTPATIENT
Start: 2020-01-06 | End: 2020-01-16

## 2020-01-06 RX ADMIN — SODIUM CHLORIDE 1000 ML: 900 INJECTION, SOLUTION INTRAVENOUS at 22:29

## 2020-01-07 LAB
ATRIAL RATE: 117 BPM
CALCULATED P AXIS, ECG09: 34 DEGREES
CALCULATED R AXIS, ECG10: 31 DEGREES
CALCULATED T AXIS, ECG11: 21 DEGREES
DIAGNOSIS, 93000: NORMAL
P-R INTERVAL, ECG05: 120 MS
Q-T INTERVAL, ECG07: 308 MS
QRS DURATION, ECG06: 84 MS
QTC CALCULATION (BEZET), ECG08: 429 MS
VENTRICULAR RATE, ECG03: 117 BPM

## 2020-01-07 NOTE — DISCHARGE INSTRUCTIONS

## 2020-01-07 NOTE — ED PROVIDER NOTES
EMERGENCY DEPARTMENT HISTORY AND PHYSICAL EXAM      Date: 1/6/2020  Patient Name: Torito Viera    History of Presenting Illness     Chief Complaint   Patient presents with    Sore Throat     x several weeks, states that he started take some old amoxicillin that he had, and then it came back worse       History Provided By: Patient    HPI: oTrito Viera, 34 y.o. male presents by POV to the ED with cc of sore throat and cough. Patient states his throat started hurting approximately a week and a half ago and he had some old amoxicillin but the throat pain came back worse. Patient states he has been drinking a lot of warm coffee to help relieve throat symptoms. Patient states he has had at least 4 to 5 cups of coffee today. There are no other complaints, changes, or physical findings at this time. PCP: Pepe Rose MD    No current facility-administered medications on file prior to encounter. Current Outpatient Medications on File Prior to Encounter   Medication Sig Dispense Refill    chlorpheniramine-HYDROcodone (TUSSIONEX PENNKINETIC ER) 10-8 mg/5 mL suspension Take 5 mL by mouth every twelve (12) hours as needed for Cough or Congestion. Max Daily Amount: 10 mL. 100 mL 0    fluticasone (FLONASE) 50 mcg/actuation nasal spray 2 Sprays by Both Nostrils route daily. 1 Bottle 0    amoxicillin-clavulanate (AUGMENTIN) 875-125 mg per tablet Take 1 Tab by mouth two (2) times a day. 20 Tab 0    predniSONE (DELTASONE) 20 mg tablet Take 2 tabs by mouth twice daily for 5 days. With food/Breakfast 10 Tab 0    lisdexamfetamine (VYVANSE) 20 mg capsule Take 20 mg by mouth two (2) times a day.          Past History     Past Medical History:  Past Medical History:   Diagnosis Date    ADD (attention deficit disorder)        Past Surgical History:  Past Surgical History:   Procedure Laterality Date    HX WISDOM TEETH EXTRACTION         Family History:  Family History   Problem Relation Age of Onset    No Known Problems Mother     No Known Problems Father        Social History:  Social History     Tobacco Use    Smoking status: Current Every Day Smoker     Packs/day: 1.00    Smokeless tobacco: Never Used   Substance Use Topics    Alcohol use: Yes     Comment: Denies~quit \"last march, I don't drink but on occasion. , still an adult. \"     Drug use: No       Allergies:  No Known Allergies      Review of Systems   Review of Systems   Constitutional: Negative for activity change, chills and fever. HENT: Positive for sore throat. Negative for congestion and rhinorrhea. Respiratory: Positive for cough. Negative for shortness of breath. Cardiovascular: Negative for chest pain. Gastrointestinal: Negative for abdominal pain, constipation, diarrhea, nausea and vomiting. Endocrine: Negative for polyuria. Genitourinary: Negative for dysuria and frequency. Musculoskeletal: Negative for back pain. Neurological: Negative for dizziness and headaches. All other systems reviewed and are negative. Physical Exam   Physical Exam  Vitals signs and nursing note reviewed. Constitutional:       General: He is not in acute distress. Appearance: He is well-developed. He is not diaphoretic. Comments: 34 y.o. male   HENT:      Head: Normocephalic and atraumatic. Right Ear: Tympanic membrane and ear canal normal. No swelling or tenderness. Left Ear: Tympanic membrane and ear canal normal.      Nose: No congestion or rhinorrhea. Mouth/Throat:      Mouth: Mucous membranes are moist.      Pharynx: Pharyngeal swelling, oropharyngeal exudate and posterior oropharyngeal erythema present. Tonsils: No tonsillar exudate or tonsillar abscesses. Eyes:      Conjunctiva/sclera: Conjunctivae normal.      Pupils: Pupils are equal, round, and reactive to light. Neck:      Musculoskeletal: Normal range of motion. Cardiovascular:      Rate and Rhythm: Normal rate and regular rhythm.       Heart sounds: Normal heart sounds. No murmur. Pulmonary:      Effort: Pulmonary effort is normal. No respiratory distress. Breath sounds: Normal breath sounds. No stridor. No wheezing, rhonchi or rales. Chest:      Chest wall: No tenderness. Abdominal:      General: Bowel sounds are normal.      Palpations: Abdomen is soft. Skin:     General: Skin is warm and dry. Capillary Refill: Capillary refill takes less than 2 seconds. Neurological:      General: No focal deficit present. Mental Status: He is alert and oriented to person, place, and time. Psychiatric:         Mood and Affect: Mood normal.         Behavior: Behavior normal.         Diagnostic Study Results     Labs -     Recent Results (from the past 12 hour(s))   EKG, 12 LEAD, INITIAL    Collection Time: 01/06/20 10:21 PM   Result Value Ref Range    Ventricular Rate 117 BPM    Atrial Rate 117 BPM    P-R Interval 120 ms    QRS Duration 84 ms    Q-T Interval 308 ms    QTC Calculation (Bezet) 429 ms    Calculated P Axis 34 degrees    Calculated R Axis 31 degrees    Calculated T Axis 21 degrees    Diagnosis Sinus tachycardia  No previous ECGs available      STREP AG SCREEN, GROUP A    Collection Time: 01/06/20 10:30 PM   Result Value Ref Range    Group A Strep Ag ID NEGATIVE  NEG         Radiologic Studies -   XR CHEST PA LAT    (Results Pending)       Medical Decision Making   I am the first provider for this patient. I reviewed the vital signs, available nursing notes, past medical history, past surgical history, family history and social history. Vital Signs-Reviewed the patient's vital signs.   Patient Vitals for the past 12 hrs:   Temp Pulse Resp BP SpO2   01/06/20 2126 98.7 °F (37.1 °C) (!) 131 16 137/90 100 %       Records Reviewed: Nursing Notes, Old Medical Records, Previous Radiology Studies and Previous Laboratory Studies    Provider Notes (Medical Decision Making):   Differential diagnoses include viral pharyngitis, bacterial pharyngitis, postnasal drip, pneumonia. ED Course:   Initial assessment performed. The patients presenting problems have been discussed, and they are in agreement with the care plan formulated and outlined with them. I have encouraged them to ask questions as they arise throughout their visit. Discussed negative chest x-rays and negative rapid strep results. Advised him to begin taking the amoxicillin treatment and soon as possible until it is complete and follow-up with his primary care provider in 3 to 5 days. Advised patient to come back to the emergency department if symptoms worsen which include inability to swallow, difficulty breathing, fever unresolved with over-the-counter Tylenol or Motrin. Critical Care Time: None    Disposition:  DISCHARGE NOTE:  11:45 PM  The pt is ready for discharge. The pt's signs, symptoms, diagnosis, and discharge instructions have been discussed and pt has conveyed their understanding. The pt is to follow up as recommended or return to ER should their symptoms worsen. Plan has been discussed and pt is in agreement. Raina Haas NP  1/6/2020      PLAN:  1. Current Discharge Medication List      START taking these medications    Details   amoxicillin (AMOXIL) 875 mg tablet Take 1 Tab by mouth two (2) times a day for 10 days. Qty: 20 Tab, Refills: 0         CONTINUE these medications which have NOT CHANGED    Details   chlorpheniramine-HYDROcodone (TUSSIONEX PENNKINETIC ER) 10-8 mg/5 mL suspension Take 5 mL by mouth every twelve (12) hours as needed for Cough or Congestion. Max Daily Amount: 10 mL. Qty: 100 mL, Refills: 0    Associated Diagnoses: Acute frontal sinusitis, recurrence not specified      fluticasone (FLONASE) 50 mcg/actuation nasal spray 2 Sprays by Both Nostrils route daily. Qty: 1 Bottle, Refills: 0      amoxicillin-clavulanate (AUGMENTIN) 875-125 mg per tablet Take 1 Tab by mouth two (2) times a day.   Qty: 20 Tab, Refills: 0      predniSONE (DELTASONE) 20 mg tablet Take 2 tabs by mouth twice daily for 5 days. With food/Breakfast  Qty: 10 Tab, Refills: 0      lisdexamfetamine (VYVANSE) 20 mg capsule Take 20 mg by mouth two (2) times a day. 2.   Follow-up Information     Follow up With Specialties Details Why Contact Info    [facility] EMERGENCY DEPT Emergency Medicine Go in 1 week As needed, If symptoms worsen [address] [place] [address]    Tigist Burgess MD Family Practice Schedule an appointment as soon as possible for a visit in 1 week As needed, If symptoms worsen [address]  [phone]          Return to ED if worse     Diagnosis     Clinical Impression:   1. Pharyngitis, unspecified etiology          Please note that this dictation was completed with [employer], the computer voice recognition software. Quite often unanticipated grammatical, syntax, homophones, and other interpretive errors are inadvertently transcribed by the computer software. Please disregards these errors. Please excuse any errors that have escaped final proofreading. This note will not be viewable in [address]

## 2020-01-07 NOTE — ED NOTES
2300: Assumed care of patient from Jim Sheikh, [facility] at this time. 2350Jodharmesh Esparza NP has reviewed discharge instructions with the patient. The patient verbalized understanding. Patient ambulatory out of ED at this time.

## 2020-01-09 LAB
BACTERIA SPEC CULT: ABNORMAL
BACTERIA SPEC CULT: ABNORMAL
SERVICE CMNT-IMP: ABNORMAL

## 2020-11-10 ENCOUNTER — HOSPITAL ENCOUNTER (EMERGENCY)
Age: 30
Discharge: HOME | End: 2020-11-10
Attending: EMERGENCY MEDICINE

## 2020-11-10 ENCOUNTER — APPOINTMENT (OUTPATIENT)
Dept: GENERAL RADIOLOGY | Age: 30
End: 2020-11-10
Attending: EMERGENCY MEDICINE

## 2020-11-10 VITALS
SYSTOLIC BLOOD PRESSURE: 133 MMHG | DIASTOLIC BLOOD PRESSURE: 100 MMHG | OXYGEN SATURATION: 98 % | TEMPERATURE: 98 F | BODY MASS INDEX: 29.81 KG/M2 | HEART RATE: 102 BPM | RESPIRATION RATE: 18 BRPM | WEIGHT: 212.96 LBS | HEIGHT: 71 IN

## 2020-11-10 DIAGNOSIS — J20.8 ACUTE VIRAL BRONCHITIS: Primary | ICD-10-CM

## 2020-11-10 DIAGNOSIS — J06.9 ACUTE URI: ICD-10-CM

## 2020-11-10 PROCEDURE — 71046 X-RAY EXAM CHEST 2 VIEWS: CPT

## 2020-11-10 PROCEDURE — 99282 EMERGENCY DEPT VISIT SF MDM: CPT

## 2020-11-10 RX ORDER — PSEUDOEPHEDRINE HCL 120 MG/1
120 TABLET, FILM COATED, EXTENDED RELEASE ORAL
Qty: 5 TAB | Refills: 0 | Status: SHIPPED | OUTPATIENT
Start: 2020-11-10 | End: 2020-11-15

## 2020-11-10 RX ORDER — DOXYCYCLINE HYCLATE 100 MG
100 TABLET ORAL 2 TIMES DAILY
Qty: 14 TAB | Refills: 0 | Status: SHIPPED | OUTPATIENT
Start: 2020-11-10 | End: 2020-11-17

## 2020-11-10 RX ORDER — FLUTICASONE PROPIONATE 50 MCG
2 SPRAY, SUSPENSION (ML) NASAL DAILY
Qty: 1 BOTTLE | Refills: 0 | Status: SHIPPED | OUTPATIENT
Start: 2020-11-10

## 2020-11-10 RX ORDER — PREDNISONE 20 MG/1
20 TABLET ORAL DAILY
Qty: 5 TAB | Refills: 0 | Status: SHIPPED | OUTPATIENT
Start: 2020-11-10 | End: 2020-11-15

## 2020-11-10 NOTE — ED NOTES
Dr. Gulshan Hdz reviewed discharge instructions with the patient. The patient verbalized understanding. All questions and concerns were addressed. The patient declined a wheelchair and is discharged ambulatory in the care of family members with instructions and prescriptions in hand. Pt is alert and oriented x 4. Respirations are clear and unlabored.

## 2020-11-10 NOTE — DISCHARGE INSTRUCTIONS
Patient Education        Bronchitis: Care Instructions  Your Care Instructions     Bronchitis is inflammation of the bronchial tubes, which carry air to the lungs. The tubes swell and produce mucus, or phlegm. The mucus and inflamed bronchial tubes make you cough. You may have trouble breathing. Most cases of bronchitis are caused by viruses like those that cause colds. Antibiotics usually do not help and they may be harmful. Bronchitis usually develops rapidly and lasts about 2 to 3 weeks in otherwise healthy people. Follow-up care is a key part of your treatment and safety. Be sure to make and go to all appointments, and call your doctor if you are having problems. It's also a good idea to know your test results and keep a list of the medicines you take. How can you care for yourself at home? · Take all medicines exactly as prescribed. Call your doctor if you think you are having a problem with your medicine. · Get some extra rest.  · Take an over-the-counter pain medicine, such as acetaminophen (Tylenol), ibuprofen (Advil, Motrin), or naproxen (Aleve) to reduce fever and relieve body aches. Read and follow all instructions on the label. · Do not take two or more pain medicines at the same time unless the doctor told you to. Many pain medicines have acetaminophen, which is Tylenol. Too much acetaminophen (Tylenol) can be harmful. · Take an over-the-counter cough medicine that contains dextromethorphan to help quiet a dry, hacking cough so that you can sleep. Avoid cough medicines that have more than one active ingredient. Read and follow all instructions on the label. · Breathe moist air from a humidifier, hot shower, or sink filled with hot water. The heat and moisture will thin mucus so you can cough it out. · Do not smoke. Smoking can make bronchitis worse. If you need help quitting, talk to your doctor about stop-smoking programs and medicines.  These can increase your chances of quitting for good.  When should you call for help? Call 911 anytime you think you may need emergency care. For example, call if:    · You have severe trouble breathing. Call your doctor now or seek immediate medical care if:    · You have new or worse trouble breathing.     · You cough up dark brown or bloody mucus (sputum).     · You have a new or higher fever.     · You have a new rash. Watch closely for changes in your health, and be sure to contact your doctor if:    · You cough more deeply or more often, especially if you notice more mucus or a change in the color of your mucus.     · You are not getting better as expected. Where can you learn more? Go to http://www.gray.com/  Enter H333 in the search box to learn more about \"Bronchitis: Care Instructions. \"  Current as of: February 24, 2020               Content Version: 12.6  © 0724-5039 [employer]. Care instructions adapted under license by [employer] (which disclaims liability or warranty for this information). If you have questions about a medical condition or this instruction, always ask your healthcare professional. [address] any warranty or liability for your use of this information.

## 2020-11-24 NOTE — ED PROVIDER NOTES
EMERGENCY DEPARTMENT HISTORY AND PHYSICAL EXAM    Please note that this dictation was completed with [employer], the computer voice recognition software. Quite often unanticipated grammatical, syntax, homophones, and other interpretive errors are inadvertently transcribed by the computer software. Please disregard these errors. Please excuse any errors that have escaped final proofreading. Date: 11/10/2020  Patient Name: Blanca Rosas  Patient Age and Sex: 27 y.o. male    History of Presenting Illness     Chief Complaint   Patient presents with    Chest Congestion     Pt reports cough and chest congestion- chronic issue-\"anytime the weather changes. \"    Cough       History Provided By: Patient    HPI: Blanca Rosas, is a 27 y.o. male whose medical history is noted below and includes tobacco dependence, recurrent, seasonal URI and bronchitis, presents to the ED with congestion, post-nasal drip, productive cough with dark yellow sputum. He has a dry cogh at baseline, this worsened over the past week or so. No significant sob. No exertional cp. No fever, chills ,changes in appetite. No exposures to anyone diagnosed with covid 19 or anyone who has been ill. Bronchodilators have helped in the past as have abx at times. Pt denies any other alleviating or exacerbating factors. No other associated signs or symptoms. There are no other complaints, changes or physical findings at this time.      PCP: Ailyn Moss MD    Past History   All documented elements of the [address] reviewed and verified by me. -Vy Petersen MD    Past Medical History:  Past Medical History:   Diagnosis Date    ADD (attention deficit disorder)        Past Surgical History:  Past Surgical History:   Procedure Laterality Date    HX WISDOM TEETH EXTRACTION         Family History:  Family History   Problem Relation Age of Onset    No Known Problems Mother     No Known Problems Father        Social History:  Social History     Tobacco Use  Smoking status: Current Every Day Smoker     Packs/day: 1.00    Smokeless tobacco: Never Used   Substance Use Topics    Alcohol use: Yes     Comment: Denies~quit \"last march, I don't drink but on occasion. , still an adult. \"     Drug use: No       Allergies:  No Known Allergies    Review of Systems   All other systems reviewed and negative    Review of Systems   Constitutional: Negative for appetite change and fever. HENT: Positive for congestion, postnasal drip and rhinorrhea. Negative for ear pain, facial swelling, sinus pressure, sore throat and trouble swallowing. Eyes: Negative. Respiratory: Positive for cough. Negative for shortness of breath and wheezing. Cardiovascular: Negative for chest pain and palpitations. Gastrointestinal: Negative for abdominal pain, nausea and vomiting. Endocrine: Negative. Genitourinary: Negative for dysuria, flank pain and hematuria. Musculoskeletal: Negative for back pain and joint swelling. Skin: Negative. Negative for rash. Neurological: Negative for dizziness, weakness, light-headedness, numbness and headaches. Hematological: Negative for adenopathy. All other systems reviewed and are negative. Physical Exam   Reviewed patients vital signs and nursing note    Physical Exam  Vitals signs and nursing note reviewed. HENT:      Head: Atraumatic. Nose: Congestion present. Mouth/Throat:      Mouth: Mucous membranes are moist.   Eyes:      General: No scleral icterus. Extraocular Movements: Extraocular movements intact. Conjunctiva/sclera: Conjunctivae normal.      Pupils: Pupils are equal, round, and reactive to light. Neck:      Musculoskeletal: Normal range of motion and neck supple. Cardiovascular:      Rate and Rhythm: Normal rate and regular rhythm. Pulses: Normal pulses. Heart sounds: Normal heart sounds. Pulmonary:      Effort: Pulmonary effort is normal.      Breath sounds: Normal breath sounds. Abdominal:      Palpations: Abdomen is soft. Tenderness: There is no abdominal tenderness. Musculoskeletal: Normal range of motion. Skin:     General: Skin is warm and dry. Capillary Refill: Capillary refill takes less than 2 seconds. Neurological:      General: No focal deficit present. Mental Status: He is alert. Psychiatric:         Mood and Affect: Mood normal.         Behavior: Behavior normal.         Diagnostic Study Results     Labs - I have personally reviewed and interpreted all laboratory results. Chrissy Ayala MD, MSc  No results found for this or any previous visit (from the past 24 hour(s)). Radiologic Studies - I have personally reviewed and interpreted all imaging studies and agree with radiology interpretation and report. - Chrissy Ayala MD, MSc  XR CHEST PA LAT   Final Result   IMPRESSION: No acute intrathoracic disease. Medical Decision Making   I am the first provider for this patient. Records Reviewed: I reviewed our electronic medical record system for any past medical records that were available that may contribute to the patient's current condition, including their PMH, surgical history, social and family history. Reviewed the nursing notes and vital signs from today's visit. Nursing notes will be reviewed as they become available in realtime while the pt has been in the ED. In addition, I read most recent discharge summaries, if available and reviewed prior ECGs or imaging studies for comparison purposes. Chrissy Ayala MD Msc    Vital Signs-Reviewed the patient's vital signs. Normal o2 sat on ra    Provider Notes (Medical Decision Making): The patient complains of nasal congestion, post nasal drip, wet productive cough without dyspnea or wheezing. Symptoms are consistent with an uncomplicated viral URI followed by symptoms of acute bronchitis. Several OTC medications suggested for symptomatic therapy.  Increase fluids, use vaporizer, stay in steamy bathroom tid 15 min prn severe cough, Tylenol/Motrin if tolerated as needed, rest, avoid smoky areas. Given history of smoking, change in sputum and length of symptoms, will give course of abx. CXR shows no evidence of pneumonia or edema. Strict return precautions reviewed including follow up prn if not better in 72 hours. ED Course:   Initial assessment performed. The patients presenting problems have been discussed, and they are in agreement with the care plan formulated and outlined with them. I have encouraged them to ask questions as they arise throughout their visit. TOBACCO COUNSELING:   Upon evaluation, pt expressed that they are a current tobacco user. For approximately 10 minutes, I counseled pt face-to-face on the dangers of smoking and encouraged quitting as soon as possible in order to decrease further risks to their health. Suggestions on how to quit smoking were provided. Pt has conveyed their understanding of the risks involved should they continue to use tobacco products. Progress note:  Patient has been reassessed and reports feeling considerably better, has normal vital signs and feels comfortable going home. I think this is reasonable as no findings today suggest a life-threatening condition. DISPOSITION: DISCHARGE  The patient's results have been reviewed with patient and available family and/or caregiver. They verbally convey their understanding and agreement of the patient's signs, symptoms, diagnosis, treatment and prognosis and additionally agree to follow up as recommended in the discharge instructions or to return to the Emergency Department should the patient's condition change prior to their follow-up appointment. The patient and available family and/or caregiver verbally agree with the care plan and all of their questions have been answered.  The discharge instructions have also been provided to the them with educational information regarding the patient's diagnosis as well a list of reasons why the patient would want to return to the ER prior to their follow-up appointment should any concerns arise, the patient's condition change or symptoms worsen. Keenan Daniel MD, Msc    PLAN:  Discharge Medication List as of 11/10/2020  3:11 AM      START taking these medications    Details   doxycycline (VIBRA-TABS) 100 mg tablet Take 1 Tab by mouth two (2) times a day for 7 days. , Normal, Disp-14 Tab,R-0      !! fluticasone propionate (FLONASE) 50 mcg/actuation nasal spray 2 Sprays by Both Nostrils route daily. , Normal, Disp-1 Bottle,R-0      pseudoephedrine CR (Sudafed 12 Hour) 120 mg CR tablet Take 1 Tab by mouth two (2) times daily as needed for Congestion for up to 5 days. , Normal, Disp-5 Tab,R-0      !! predniSONE (DELTASONE) 20 mg tablet Take 20 mg by mouth daily for 5 days. With Breakfast, Normal, Disp-5 Tab,R-0       !! - Potential duplicate medications found. Please discuss with provider. CONTINUE these medications which have NOT CHANGED    Details   chlorpheniramine-HYDROcodone (TUSSIONEX PENNKINETIC ER) 10-8 mg/5 mL suspension Take 5 mL by mouth every twelve (12) hours as needed for Cough or Congestion. Max Daily Amount: 10 mL., Print, Disp-100 mL, R-0      !! fluticasone (FLONASE) 50 mcg/actuation nasal spray 2 Sprays by Both Nostrils route daily. , Print, Disp-1 Bottle, R-0      amoxicillin-clavulanate (AUGMENTIN) 875-125 mg per tablet Take 1 Tab by mouth two (2) times a day., Print, Disp-20 Tab, R-0      !! predniSONE (DELTASONE) 20 mg tablet Take 2 tabs by mouth twice daily for 5 days. With food/Breakfast, Normal, Disp-10 Tab, R-0      lisdexamfetamine (VYVANSE) 20 mg capsule Take 20 mg by mouth two (2) times a day., Historical Med       !! - Potential duplicate medications found. Please discuss with provider.       1.   2.     Follow-up Information     Follow up With Specialties Details Why Sabine Silva, Pamela Dudley MD Family Medicine Call in 3 days for follow up [address] [place] [address].. [phone] [phone]  [phone]      [facility] EMERGENCY DEPT Emergency Medicine  As needed, If symptoms worsen [address]  [address] [facility]  [phone]        3. Return to ED if worse       I, Vanita Tucker MD, am the attending of record for this patient encounter. Diagnosis     Clinical Impression:   1. Acute viral bronchitis    2. Acute URI        Attestation:  I personally performed the services described in this documentation on this date 11/10/2020 for patient Laron Lopez.   Suki Viera MD

## 2023-05-11 RX ORDER — AMOXICILLIN AND CLAVULANATE POTASSIUM 875; 125 MG/1; MG/1
1 TABLET, FILM COATED ORAL 2 TIMES DAILY
COMMUNITY
Start: 2018-12-10

## 2023-05-11 RX ORDER — FLUTICASONE PROPIONATE 50 MCG
2 SPRAY, SUSPENSION (ML) NASAL DAILY
COMMUNITY
Start: 2018-12-10

## 2023-05-16 RX ORDER — HYDROCODONE POLISTIREX AND CHLORPHENIRAMINE POLISTIREX 10; 8 MG/5ML; MG/5ML
SUSPENSION, EXTENDED RELEASE ORAL
COMMUNITY
Start: 2018-12-10

## 2023-05-16 RX ORDER — PREDNISONE 20 MG/1
TABLET ORAL
COMMUNITY
Start: 2017-11-01

## 2024-02-06 ENCOUNTER — OFFICE VISIT (OUTPATIENT)
Age: 34
End: 2024-02-06

## 2024-02-06 VITALS
TEMPERATURE: 97.8 F | SYSTOLIC BLOOD PRESSURE: 127 MMHG | OXYGEN SATURATION: 97 % | DIASTOLIC BLOOD PRESSURE: 83 MMHG | WEIGHT: 210 LBS | RESPIRATION RATE: 18 BRPM | HEART RATE: 101 BPM

## 2024-02-06 DIAGNOSIS — J02.9 PHARYNGITIS, UNSPECIFIED ETIOLOGY: Primary | ICD-10-CM

## 2024-02-06 DIAGNOSIS — J20.9 BRONCHITIS WITH BRONCHOSPASM: ICD-10-CM

## 2024-02-06 RX ORDER — AMOXICILLIN 875 MG/1
875 TABLET, COATED ORAL 2 TIMES DAILY
Qty: 14 TABLET | Refills: 0 | Status: SHIPPED | OUTPATIENT
Start: 2024-02-06 | End: 2024-02-13

## 2024-02-06 ASSESSMENT — ENCOUNTER SYMPTOMS
COUGH: 1
SORE THROAT: 1
SINUS PAIN: 0
CHEST TIGHTNESS: 0
HEMOPTYSIS: 0
SHORTNESS OF BREATH: 0
SINUS PRESSURE: 1

## 2024-02-07 NOTE — PROGRESS NOTES
oropharyngeal exudate.   Cardiovascular:      Pulses: Normal pulses.      Heart sounds: Normal heart sounds.   Pulmonary:      Effort: No tachypnea, accessory muscle usage, prolonged expiration or respiratory distress.      Breath sounds: No decreased air movement. Decreased breath sounds present. No wheezing, rhonchi or rales.   Abdominal:      Tenderness: There is no abdominal tenderness.   Lymphadenopathy:      Cervical: No cervical adenopathy.   Neurological:      Mental Status: He is alert.          1. Pharyngitis, unspecified etiology  2. Bronchitis with bronchospasm   Mucinex Fast max 2 tab 4 times/ day - day/ night pack   Or Dayquil/Nyquil    Motrin as needed  Zyrtec/ Allegra daily   Saline sinus rinse     Use amoxicillin if sxs worsen     No results found for any visits on 02/06/24.  The patients condition was discussed with the patient and they understand.  The patient is to follow up with primary care doctor.  If signs and symptoms become worse the pt is to go to the ER. The patient is to take medications as prescribed.

## 2024-02-07 NOTE — PATIENT INSTRUCTIONS
Mucinex Fast max 2 tab 4 times/ day - day/ night pack   Or Dayquil/Nyquil    Motrin as needed  Zyrtec/ Allegra daily   Saline sinus rinse     Use amoxicillin if sxs worsen

## 2024-02-11 ENCOUNTER — OFFICE VISIT (OUTPATIENT)
Age: 34
End: 2024-02-11

## 2024-02-11 VITALS
WEIGHT: 213.8 LBS | HEART RATE: 118 BPM | OXYGEN SATURATION: 96 % | DIASTOLIC BLOOD PRESSURE: 84 MMHG | SYSTOLIC BLOOD PRESSURE: 146 MMHG | TEMPERATURE: 97.7 F

## 2024-02-11 DIAGNOSIS — J20.9 BRONCHITIS, ACUTE, WITH BRONCHOSPASM: Primary | ICD-10-CM

## 2024-02-11 RX ORDER — ALBUTEROL SULFATE 90 UG/1
2 AEROSOL, METERED RESPIRATORY (INHALATION) 4 TIMES DAILY PRN
Qty: 18 G | Refills: 0 | Status: SHIPPED | OUTPATIENT
Start: 2024-02-11

## 2024-02-11 RX ORDER — AZITHROMYCIN 250 MG/1
250 TABLET, FILM COATED ORAL SEE ADMIN INSTRUCTIONS
Qty: 6 TABLET | Refills: 0 | Status: SHIPPED | OUTPATIENT
Start: 2024-02-11 | End: 2024-02-16

## 2024-02-11 RX ORDER — PREDNISONE 50 MG/1
50 TABLET ORAL DAILY
Qty: 5 TABLET | Refills: 0 | Status: SHIPPED | OUTPATIENT
Start: 2024-02-11 | End: 2024-02-16

## 2024-02-11 RX ORDER — DEXTROMETHORPHAN HYDROBROMIDE AND PROMETHAZINE HYDROCHLORIDE 15; 6.25 MG/5ML; MG/5ML
5 SYRUP ORAL 4 TIMES DAILY PRN
Qty: 120 ML | Refills: 0 | Status: SHIPPED | OUTPATIENT
Start: 2024-02-11

## 2024-02-11 RX ORDER — BENZONATATE 200 MG/1
200 CAPSULE ORAL 3 TIMES DAILY PRN
Qty: 30 CAPSULE | Refills: 0 | Status: SHIPPED | OUTPATIENT
Start: 2024-02-11

## 2024-02-22 ENCOUNTER — OFFICE VISIT (OUTPATIENT)
Age: 34
End: 2024-02-22

## 2024-02-22 VITALS
TEMPERATURE: 98.3 F | RESPIRATION RATE: 18 BRPM | SYSTOLIC BLOOD PRESSURE: 118 MMHG | OXYGEN SATURATION: 97 % | DIASTOLIC BLOOD PRESSURE: 87 MMHG | HEART RATE: 99 BPM | WEIGHT: 211 LBS

## 2024-02-22 DIAGNOSIS — R05.1 ACUTE COUGH: ICD-10-CM

## 2024-02-22 DIAGNOSIS — B96.89 ACUTE BACTERIAL SINUSITIS: Primary | ICD-10-CM

## 2024-02-22 DIAGNOSIS — J02.9 PHARYNGITIS, UNSPECIFIED ETIOLOGY: ICD-10-CM

## 2024-02-22 DIAGNOSIS — J01.90 ACUTE BACTERIAL SINUSITIS: Primary | ICD-10-CM

## 2024-02-22 RX ORDER — DOXYCYCLINE HYCLATE 100 MG
100 TABLET ORAL 2 TIMES DAILY
Qty: 14 TABLET | Refills: 0 | Status: SHIPPED | OUTPATIENT
Start: 2024-02-22 | End: 2024-02-29

## 2024-02-22 RX ORDER — PREDNISONE 20 MG/1
40 TABLET ORAL DAILY
Qty: 6 TABLET | Refills: 0 | Status: SHIPPED | OUTPATIENT
Start: 2024-02-22 | End: 2024-02-25

## 2024-02-22 RX ORDER — PREDNISONE 20 MG/1
40 TABLET ORAL DAILY
Qty: 6 TABLET | Refills: 0 | Status: SHIPPED | OUTPATIENT
Start: 2024-02-22 | End: 2024-02-22 | Stop reason: SDUPTHER

## 2024-02-22 RX ORDER — BENZONATATE 200 MG/1
200 CAPSULE ORAL 3 TIMES DAILY PRN
Qty: 21 CAPSULE | Refills: 0 | Status: SHIPPED | OUTPATIENT
Start: 2024-02-22 | End: 2024-02-22 | Stop reason: SDUPTHER

## 2024-02-22 RX ORDER — DOXYCYCLINE HYCLATE 100 MG
100 TABLET ORAL 2 TIMES DAILY
Qty: 14 TABLET | Refills: 0 | Status: SHIPPED | OUTPATIENT
Start: 2024-02-22 | End: 2024-02-22 | Stop reason: SDUPTHER

## 2024-02-22 RX ORDER — BENZONATATE 200 MG/1
200 CAPSULE ORAL 3 TIMES DAILY PRN
Qty: 21 CAPSULE | Refills: 0 | Status: SHIPPED | OUTPATIENT
Start: 2024-02-22 | End: 2024-02-29

## 2024-02-23 NOTE — PATIENT INSTRUCTIONS
Symptoms consistent with acute bacterial sinusitis  Doxycycline as ordered, 2x per day for 7 days  Benzonatate up to 3x daily for cough  Prednisone 40mg daily for 3 days for sore throat and chest tightness  Tylenol cold and flu (or other similar cold and flu medication) for relief of symptoms  Saline sinus rinses, hot tea with honey, throat lozenges  Return if symptoms persist or worsen  ED with any severe shortness of breath, chest pain, or if unable to tolerate food or fluids

## 2024-02-23 NOTE — PROGRESS NOTES
Rodrick Trimble (:  1990) is a 34 y.o. male,Established patient, here for evaluation of the following chief complaint(s):  Cough (Cough and congestion x 2 days )      ASSESSMENT/PLAN:  Visit Diagnoses and Associated Orders       Acute bacterial sinusitis    -  Primary    doxycycline hyclate (VIBRA-TABS) 100 MG tablet [2625]           Pharyngitis, unspecified etiology        predniSONE (DELTASONE) 20 MG tablet [6496]           Acute cough        benzonatate (TESSALON) 200 MG capsule [62742]                 Symptoms consistent with acute bacterial sinusitis  Doxycycline as ordered, 2x per day for 7 days  Benzonatate up to 3x daily for cough  Prednisone 40mg daily for 3 days for sore throat and chest tightness  Tylenol cold and flu (or other similar cold and flu medication) for relief of symptoms  Saline sinus rinses, hot tea with honey, throat lozenges  Return if symptoms persist or worsen  ED with any severe shortness of breath, chest pain, or if unable to tolerate food or fluids    SUBJECTIVE/OBJECTIVE:  HPI     34 y.o. male presents with symptoms of that have acutely worsened over the past 2 days. He states that he has been fighting infection for the better part of the past 2 weeks. He was seen and treated here twice over the past 2 weeks for both pharyngitis and bronchitis with a combination of antibiotics and steroids. He felt like he was getting better, but then about 2 days ago acutely worsened with fatigue, body aches, sinus congestion, sore throat and cough. He denies any fevers or chills. No ear pain. No shortness of breath or wheezing. No chest or abdominal pain. No nausea, vomiting or diarrhea. Reports yellow/green mucous from nose. Cough productive of green yellow mucous. He does not wish to have flu or covid tests done today. Wants to go back to work tomorrow and wants to feel better quickly to return to work faster.         Vitals:    24 1920   BP: 118/87   Site: Left Upper Arm

## 2024-03-28 ENCOUNTER — OFFICE VISIT (OUTPATIENT)
Age: 34
End: 2024-03-28

## 2024-03-28 VITALS
WEIGHT: 217.8 LBS | TEMPERATURE: 98.1 F | DIASTOLIC BLOOD PRESSURE: 86 MMHG | OXYGEN SATURATION: 97 % | RESPIRATION RATE: 18 BRPM | SYSTOLIC BLOOD PRESSURE: 123 MMHG | HEART RATE: 87 BPM

## 2024-03-28 DIAGNOSIS — J20.9 ACUTE BRONCHITIS, UNSPECIFIED ORGANISM: ICD-10-CM

## 2024-03-28 DIAGNOSIS — J02.9 SORE THROAT: ICD-10-CM

## 2024-03-28 DIAGNOSIS — J32.9 RECURRENT SINUSITIS: Primary | ICD-10-CM

## 2024-03-28 LAB
STREP PYOGENES DNA, POC: NEGATIVE
VALID INTERNAL CONTROL, POC: NORMAL

## 2024-03-28 RX ORDER — DOXYCYCLINE HYCLATE 100 MG
100 TABLET ORAL 2 TIMES DAILY
Qty: 14 TABLET | Refills: 0 | Status: SHIPPED | OUTPATIENT
Start: 2024-03-28 | End: 2024-04-04

## 2024-03-28 RX ORDER — PREDNISONE 20 MG/1
TABLET ORAL
Qty: 10 TABLET | Refills: 0 | Status: SHIPPED | OUTPATIENT
Start: 2024-03-28

## 2024-03-28 RX ORDER — TRIAMCINOLONE ACETONIDE 55 UG/1
2 SPRAY, METERED NASAL DAILY
Qty: 1 EACH | Refills: 0 | Status: SHIPPED | OUTPATIENT
Start: 2024-03-28

## 2024-03-28 RX ORDER — LEVOCETIRIZINE DIHYDROCHLORIDE 5 MG/1
5 TABLET, FILM COATED ORAL NIGHTLY
Qty: 30 TABLET | Refills: 2 | Status: SHIPPED | OUTPATIENT
Start: 2024-03-28

## 2024-03-28 NOTE — PROGRESS NOTES
Subjective: (As above and below)     The patient/guardian gave verbal consent to treat.        Chief Complaint   Patient presents with    Pharyngitis     Sore throat x 2 days and cough on and off x 2 months      HPI  Rodrick Trimble is a 34 y.o. male who presents for evaluation of : cough. Symptom onset 2 months ago. Associated sore throat this week. No fever or chills. Sometimes productive. Former smoker. Denies PMH of asthma. Has had sinus /bronchitis treated several times in the past 2 months .       Review of Systems    Review of Systems - negative except as listed above    Reviewed PmHx, RxHx, FmHx, SocHx, AllgHx and updated in chart.  Family History   Problem Relation Age of Onset    No Known Problems Father     No Known Problems Mother        Past Medical History:   Diagnosis Date    ADD (attention deficit disorder)       Social History     Socioeconomic History    Marital status: Single     Spouse name: None    Number of children: None    Years of education: None    Highest education level: None   Tobacco Use    Smoking status: Former     Current packs/day: 1.00     Average packs/day: 1 pack/day for 10.1 years (10.1 ttl pk-yrs)     Types: Cigarettes     Start date: 2/6/2014     Passive exposure: Past    Smokeless tobacco: Never   Vaping Use    Vaping Use: Never used   Substance and Sexual Activity    Alcohol use: Yes     Comment: occ    Drug use: No          Current Outpatient Medications   Medication Sig    doxycycline hyclate (VIBRA-TABS) 100 MG tablet Take 1 tablet by mouth 2 times daily for 7 days    predniSONE (DELTASONE) 20 MG tablet Take 2 tabs by mouth once daily for 5 days.    triamcinolone (NASACORT ALLERGY 24HR) 55 MCG/ACT nasal inhaler 2 sprays by Each Nostril route daily    levocetirizine (XYZAL) 5 MG tablet Take 1 tablet by mouth nightly    albuterol sulfate HFA (VENTOLIN HFA) 108 (90 Base) MCG/ACT inhaler Inhale 2 puffs into the lungs 4 times daily as needed for Wheezing    fluticasone

## 2024-09-02 ENCOUNTER — OFFICE VISIT (OUTPATIENT)
Age: 34
End: 2024-09-02

## 2024-09-02 VITALS
WEIGHT: 198 LBS | SYSTOLIC BLOOD PRESSURE: 118 MMHG | DIASTOLIC BLOOD PRESSURE: 84 MMHG | OXYGEN SATURATION: 98 % | TEMPERATURE: 97 F | HEART RATE: 112 BPM

## 2024-09-02 DIAGNOSIS — J02.9 SORE THROAT: ICD-10-CM

## 2024-09-02 DIAGNOSIS — J06.9 VIRAL UPPER RESPIRATORY ILLNESS: Primary | ICD-10-CM

## 2024-09-02 LAB
INFLUENZA A ANTIGEN, POC: NEGATIVE
INFLUENZA B ANTIGEN, POC: NEGATIVE
Lab: NORMAL
PERFORMING INSTRUMENT: NORMAL
QC PASS/FAIL: NORMAL
SARS-COV-2, POC: NORMAL
STREP PYOGENES DNA, POC: NEGATIVE
VALID INTERNAL CONTROL, POC: NORMAL

## 2024-09-02 RX ORDER — TADALAFIL 5 MG/1
5 TABLET ORAL PRN
COMMUNITY

## 2024-09-02 RX ORDER — BUPROPION HYDROCHLORIDE 300 MG/1
1 TABLET ORAL DAILY
COMMUNITY

## 2024-09-02 RX ORDER — VALACYCLOVIR HYDROCHLORIDE 500 MG/1
1 TABLET, FILM COATED ORAL DAILY
COMMUNITY

## 2024-09-02 RX ORDER — MONTELUKAST SODIUM 10 MG/1
1 TABLET ORAL DAILY
COMMUNITY

## 2024-09-02 NOTE — PATIENT INSTRUCTIONS
Patient was seen today for fevers, chills, body aches, fatigue and other upper respiratory symptoms  Negative COVID, strep and flu test in clinic today  I do suspect a viral upper respiratory tract infection of some type  I would like for him to treat symptomatically, Tylenol/ibuprofen over-the-counter as needed  Alternatively, you could replace the Tylenol with a good multisymptom relief medication, something like Tylenol severe cold and flu, Mucinex fast max, or DayQuil/NyQuil  Lots of fluids, plenty of rest  Hot tea with honey, saline sinus rinses, throat lozenges  Please monitor your symptoms carefully and return if symptoms or not improving  Go to the emergency department if you develop any shortness of breath, chest pain, severe abdominal pain, or if acutely worsening

## 2024-09-02 NOTE — PROGRESS NOTES
chest or abdominal pain, no nausea, vomiting or diarrhea.  He states that he feels like he was hit by a bus.  He is taking over-the-counter fever reducers with mild relief.         Vitals:    09/02/24 1559 09/02/24 1652   BP: 118/84    Site: Right Upper Arm    Position: Sitting    Cuff Size: Medium Adult    Pulse: (!) 131 (!) 112   Temp: 97 °F (36.1 °C)    SpO2: 98%    Weight: 89.8 kg (198 lb)      Results for orders placed or performed in visit on 09/02/24   AMB POC STREP GO A DIRECT, DNA PROBE   Result Value Ref Range    Valid Internal Control, POC pass     Strep pyogenes DNA, POC Negative Not Detected   POCT COVID-19, Antigen   Result Value Ref Range    SARS-COV-2, POC Not-Detected Not Detected    Lot Number [id number]     QC Pass/Fail pass     Performing Instrument BinaxNOW    POCT Influenza A/B Antigen   Result Value Ref Range    Inflenza A Ag negative     Influenza B Ag negative           Objective   Physical Exam  Vitals and nursing note reviewed.   Constitutional:       General: He is not in acute distress.     Appearance: Normal appearance. He is ill-appearing.   HENT:      Head: Normocephalic and atraumatic.      Right Ear: Tympanic membrane, ear canal and external ear normal. There is no impacted cerumen.      Left Ear: Tympanic membrane, ear canal and external ear normal. There is no impacted cerumen.      Nose: Congestion present. No rhinorrhea.      Right Turbinates: Not enlarged or swollen.      Left Turbinates: Not enlarged or swollen.      Mouth/Throat:      Mouth: Mucous membranes are moist.      Pharynx: Oropharynx is clear. Uvula midline. Posterior oropharyngeal erythema and postnasal drip present. No pharyngeal swelling, oropharyngeal exudate or uvula swelling.      Tonsils: No tonsillar exudate or tonsillar abscesses.   Cardiovascular:      Rate and Rhythm: Regular rhythm. Tachycardia present.      Pulses: Normal pulses.      Heart sounds: Normal heart sounds. No murmur heard.     No friction rub.

## 2024-09-04 ENCOUNTER — OFFICE VISIT (OUTPATIENT)
Age: 34
End: 2024-09-04

## 2024-09-04 VITALS
BODY MASS INDEX: 28.28 KG/M2 | SYSTOLIC BLOOD PRESSURE: 106 MMHG | HEIGHT: 71 IN | OXYGEN SATURATION: 93 % | TEMPERATURE: 98.8 F | DIASTOLIC BLOOD PRESSURE: 68 MMHG | WEIGHT: 202 LBS | HEART RATE: 117 BPM

## 2024-09-04 DIAGNOSIS — R05.1 ACUTE COUGH: ICD-10-CM

## 2024-09-04 DIAGNOSIS — J18.9 PNEUMONIA OF RIGHT MIDDLE LOBE DUE TO INFECTIOUS ORGANISM: Primary | ICD-10-CM

## 2024-09-04 RX ORDER — AMOXICILLIN 500 MG/1
1000 CAPSULE ORAL 3 TIMES DAILY
Qty: 30 CAPSULE | Refills: 0 | Status: SHIPPED | OUTPATIENT
Start: 2024-09-04 | End: 2024-09-09

## 2024-09-04 RX ORDER — BENZONATATE 200 MG/1
200 CAPSULE ORAL 2 TIMES DAILY PRN
Qty: 14 CAPSULE | Refills: 0 | Status: SHIPPED | OUTPATIENT
Start: 2024-09-04 | End: 2024-09-11

## 2024-09-04 NOTE — PROGRESS NOTES
Subjective:       History was provided by the patient.  Rodrick Trimble is a 34 y.o. male who presents for evaluation of follow up on a URI. Symptoms include chills, dry cough, myalgias, sinus and nasal congestion, and sore throat. Onset of symptoms was 5 days ago, gradually worsening since that time. Associated symptoms include congestion, nasal congestion, non productive cough, post nasal drip, sinus pressure, and sore throat.  He is drinking plenty of fluids. Evaluation to date: seen previously and thought to have a viral URI. Treatment to date: Theraflu, Tylenol and Ibuprofen  Patient's medications, allergies, past medical, surgical, social and family histories were reviewed and updated as appropriate.    Review of Systems  Pertinent items are noted in HPI.      Objective:      General appearance: alert, appears stated age, and cooperative  Ears: normal TM's and external ear canals both ears  Nose: no discharge, turbinates normal, no sinus tenderness  Throat: normal findings: pink and moist  Lungs: clear to auscultation bilaterally  Heart: S1, S2 normal and tachycardic  Lymph nodes: Cervical adenopathy: nodes normal         Assessment:      1. Acute cough  - XR CHEST (2 VIEWS); Future    2. Pneumonia of right middle lobe due to infectious organism  Patient appears mildly ill, VSS, afebrile, SPO2 93% on room air. No distress noted. Ears normal.  Throat and pharynx normal.  Neck supple. No adenopathy in the neck. Nose is congested. Sinuses non tender. The chest is clear, without wheezes or rales. XR indicates right middle lobe consolidation concerning for lobar pneumonia.     Patient is previously healthy and immunocompetent, presenting with symptoms suspicious for likely bacterial upper respiratory infection.  Differential includes acute bronchitis, rhinosinusitis, allergic rhinitis, bacterial pneumonia, or COVID.  Do not suspect underlying cardiopulmonary process.  Patient is nontoxic appearing and not in need

## 2024-09-04 NOTE — PATIENT INSTRUCTIONS
If symptoms worsens or fail to improve follow-up with PCP or ER.    Thank you for visiting [facility] today    Nasal Congestion:  Flonase or Nasonex (over the counter) nasal spray, once a day  Saline irrigation kits help wash out sinuses 1-2 times a day  Normal saline nasal spray    Cough:  Throat lozenges, hot tea, and honey may help  Vicks VapoRub at night to help with cough and relieve muscles aches and pain  Sore Throat:  Lozenges, as needed. Cepacol lozenges will help numb the throat  Chloraseptic spray also helps to numb throat pain  Salt water gargles to soothe throat pain  Sinus pain/pressure:  Warm, wet towel on face to help with facial sinus pain/pressure  Headache/Pain Fever/Body Aches:  If you can take NSAIDs, take Ibuprofen 400-800mg every 8 hours as needed  If you cannot take NSAIDs, take Tylenol 325-500mg every 6 hours as needed  Miscellanous:  Zyrtec/Xyzal/Allegra/Claritin during the day.  You  may also use the decongestant version of these medications.   Simple foods like chicken noodle soup, smoothies, hot tea with lemon and honey may also help  Avoid smoking  Minimize exposure to irritants    Please follow up with your primary care provider within 2-5 days if your signs and symptoms have not resolved or worsened.    Please go immediately to the Emergency Department if you develop shortness of breath, chest pain and uncontrollable fever above 100.4F.

## 2024-11-05 ENCOUNTER — OFFICE VISIT (OUTPATIENT)
Age: 34
End: 2024-11-05

## 2024-11-05 VITALS
DIASTOLIC BLOOD PRESSURE: 95 MMHG | OXYGEN SATURATION: 98 % | HEART RATE: 100 BPM | WEIGHT: 205 LBS | RESPIRATION RATE: 18 BRPM | SYSTOLIC BLOOD PRESSURE: 151 MMHG | BODY MASS INDEX: 28.59 KG/M2 | TEMPERATURE: 98.4 F

## 2024-11-05 DIAGNOSIS — J40 BRONCHITIS: Primary | ICD-10-CM

## 2024-11-05 DIAGNOSIS — R05.1 ACUTE COUGH: ICD-10-CM

## 2024-11-05 RX ORDER — BENZONATATE 200 MG/1
200 CAPSULE ORAL 3 TIMES DAILY PRN
Qty: 30 CAPSULE | Refills: 0 | Status: SHIPPED | OUTPATIENT
Start: 2024-11-05 | End: 2024-11-15

## 2024-11-05 RX ORDER — DEXTROMETHORPHAN HYDROBROMIDE AND PROMETHAZINE HYDROCHLORIDE 15; 6.25 MG/5ML; MG/5ML
5 SYRUP ORAL 4 TIMES DAILY PRN
Qty: 100 ML | Refills: 0 | Status: SHIPPED | OUTPATIENT
Start: 2024-11-05 | End: 2024-11-05

## 2024-11-05 RX ORDER — ALBUTEROL SULFATE 90 UG/1
2 INHALANT RESPIRATORY (INHALATION) EVERY 6 HOURS PRN
Qty: 18 G | Refills: 3 | Status: SHIPPED | OUTPATIENT
Start: 2024-11-05

## 2024-11-05 RX ORDER — ALBUTEROL SULFATE 90 UG/1
2 INHALANT RESPIRATORY (INHALATION) EVERY 6 HOURS PRN
Qty: 18 G | Refills: 3 | Status: SHIPPED | OUTPATIENT
Start: 2024-11-05 | End: 2024-11-05

## 2024-11-05 RX ORDER — DEXTROMETHORPHAN HYDROBROMIDE AND PROMETHAZINE HYDROCHLORIDE 15; 6.25 MG/5ML; MG/5ML
5 SYRUP ORAL 4 TIMES DAILY PRN
Qty: 100 ML | Refills: 0 | Status: SHIPPED | OUTPATIENT
Start: 2024-11-05 | End: 2024-11-12

## 2024-11-05 RX ORDER — DEXAMETHASONE SODIUM PHOSPHATE 10 MG/ML
10 INJECTION INTRAMUSCULAR; INTRAVENOUS ONCE
Status: COMPLETED | OUTPATIENT
Start: 2024-11-05 | End: 2024-11-05

## 2024-11-05 RX ORDER — BENZONATATE 200 MG/1
200 CAPSULE ORAL 3 TIMES DAILY PRN
Qty: 30 CAPSULE | Refills: 0 | Status: SHIPPED | OUTPATIENT
Start: 2024-11-05 | End: 2024-11-05

## 2024-11-05 RX ORDER — LISDEXAMFETAMINE DIMESYLATE 40 MG/1
1 CAPSULE ORAL DAILY
COMMUNITY
Start: 2024-10-15

## 2024-11-05 RX ADMIN — DEXAMETHASONE SODIUM PHOSPHATE 10 MG: 10 INJECTION INTRAMUSCULAR; INTRAVENOUS at 18:31

## 2024-11-05 NOTE — PATIENT INSTRUCTIONS
Viral upper respiratory infection does not require antibiotic as they do not treat / help viral illnesses. Viral symptoms will usually improve over the next week, but lingering cough or congestion can take several weeks to completely resolve - this is normal.   To prevent dehydration, drink plenty of fluids. Choose water and other clear liquids until you feel better.   Take an over-the-counter pain medicine, such as acetaminophen (Tylenol), ibuprofen (Advil, Motrin), or naproxen (Aleve) for fever or pain, can take Tylenol and Motrin together for more significant pain or can alternate every 4 hours for pain / fever. Be safe with medicines. Read and follow all instructions on the label. No one younger than 18 should take aspirin. It has been linked to Reye syndrome, a serious illness.  Be careful when taking over-the-counter cold or influenza (flu) medicines and Tylenol at the same time. Many of these medicines have acetaminophen, which is Tylenol. Read the labels to make sure that you are not taking more than the recommended dose. Too much acetaminophen (Tylenol) can be harmful.  Get plenty of rest.  Use saline (saltwater) nasal washes to help keep your nasal passages open and wash out mucus and allergens. You can buy saline nose sprays at a grocery store or drugstore. Follow the instructions on the package.   Use a vaporizer or humidifier to add moisture to your bedroom. Follow the instructions for cleaning the machine.   Recommend warm tea with honey, saltwater gargles and other natural remedies for symptoms.   Do not smoke or allow others to smoke around you. If you need help quitting, talk to your doctor about stop-smoking programs and medicines. These can increase your chances of quitting for good.  Return to clinic if symptoms change, fever, or mild worsening of symptoms.  Go to ER immediately if chest pain, shortness of breath, dehydration, high or persistent fevers or any new concerning symptoms.  Follow up

## 2024-11-05 NOTE — PROGRESS NOTES
Patient Name: Rodrick Trimble   YOB: 1990   Patient Status: Established patient,   Chief Complaint: Other (Pt present for Xray wants to see if he has pnmonia . Pt upper respiratory coughing ) and Conjunctivitis (Right eye irritation )      ____________________________________________________________________________________________    External Records Reviewed: none    Limitation to History: none    Outside Historian: none    SUBJECTIVE/OBJECTIVE:  Rodrick Trimble is a 34 y.o. male presents with complaint of productive cough with yellow sputum production, congestion, and runny nose. Symptoms began 2 weeks and are worsening since yesterday.  He reports pneumonia 5-6 weeks ago and says this feels similar and would like a chest x-ray to make sure.   The patient denies fever, chest pain or shortness of breath. He denies asthma, smoking or vape history.  No other acute symptoms reported at this time.          PAST MEDICAL HISTORY:   Medical: Pt  has a past medical history of ADD (attention deficit disorder).  Surgical: Pt  has a past surgical history that includes [place] tooth extraction.  Family: Pt family history includes No Known Problems in his father and mother.  Social: Pt   Social History     Socioeconomic History    Marital status: Single     Spouse name: Not on file    Number of children: Not on file    Years of education: Not on file    Highest education level: Not on file   Occupational History    Not on file   Tobacco Use    Smoking status: Former     Current packs/day: 1.00     Average packs/day: 1 pack/day for 10.7 years (10.7 ttl pk-yrs)     Types: Cigarettes     Start date: 2/6/2014     Passive exposure: Past    Smokeless tobacco: Never   Vaping Use    Vaping status: Never Used   Substance and Sexual Activity    Alcohol use: Yes     Comment: occ    Drug use: No    Sexual activity: Not on file   Other Topics Concern    Not on file   Social History Narrative    Not on file     Social